# Patient Record
Sex: MALE | Race: WHITE | NOT HISPANIC OR LATINO | Employment: FULL TIME | URBAN - METROPOLITAN AREA
[De-identification: names, ages, dates, MRNs, and addresses within clinical notes are randomized per-mention and may not be internally consistent; named-entity substitution may affect disease eponyms.]

---

## 2023-06-17 ENCOUNTER — APPOINTMENT (EMERGENCY)
Dept: CT IMAGING | Facility: HOSPITAL | Age: 48
DRG: 354 | End: 2023-06-17
Payer: COMMERCIAL

## 2023-06-17 ENCOUNTER — HOSPITAL ENCOUNTER (INPATIENT)
Facility: HOSPITAL | Age: 48
LOS: 2 days | Discharge: HOME | DRG: 354 | End: 2023-06-19
Attending: EMERGENCY MEDICINE | Admitting: SURGERY
Payer: COMMERCIAL

## 2023-06-17 DIAGNOSIS — K43.6 VENTRAL HERNIA WITH BOWEL OBSTRUCTION: ICD-10-CM

## 2023-06-17 DIAGNOSIS — E66.01 MORBID OBESITY (HCC): ICD-10-CM

## 2023-06-17 DIAGNOSIS — K56.609 LARGE BOWEL OBSTRUCTION (HCC): ICD-10-CM

## 2023-06-17 DIAGNOSIS — K59.00 CONSTIPATION: ICD-10-CM

## 2023-06-17 DIAGNOSIS — R10.31 RIGHT LOWER QUADRANT ABDOMINAL PAIN: Primary | ICD-10-CM

## 2023-06-17 LAB
ALBUMIN SERPL BCP-MCNC: 4.3 G/DL (ref 3.5–5)
ALP SERPL-CCNC: 70 U/L (ref 34–104)
ALT SERPL W P-5'-P-CCNC: 23 U/L (ref 7–52)
ANION GAP SERPL CALCULATED.3IONS-SCNC: 6 MMOL/L (ref 4–13)
AST SERPL W P-5'-P-CCNC: 21 U/L (ref 13–39)
BASOPHILS # BLD AUTO: 0.04 THOUSANDS/ÂΜL (ref 0–0.1)
BASOPHILS NFR BLD AUTO: 0 % (ref 0–1)
BILIRUB DIRECT SERPL-MCNC: 0.13 MG/DL (ref 0–0.2)
BILIRUB SERPL-MCNC: 0.57 MG/DL (ref 0.2–1)
BUN SERPL-MCNC: 15 MG/DL (ref 5–25)
CALCIUM SERPL-MCNC: 9.4 MG/DL (ref 8.4–10.2)
CHLORIDE SERPL-SCNC: 97 MMOL/L (ref 96–108)
CO2 SERPL-SCNC: 32 MMOL/L (ref 21–32)
CREAT SERPL-MCNC: 1.17 MG/DL (ref 0.6–1.3)
EOSINOPHIL # BLD AUTO: 0.14 THOUSAND/ÂΜL (ref 0–0.61)
EOSINOPHIL NFR BLD AUTO: 1 % (ref 0–6)
ERYTHROCYTE [DISTWIDTH] IN BLOOD BY AUTOMATED COUNT: 15.1 % (ref 11.6–15.1)
GFR SERPL CREATININE-BSD FRML MDRD: 72 ML/MIN/1.73SQ M
GLUCOSE SERPL-MCNC: 115 MG/DL (ref 65–140)
HCT VFR BLD AUTO: 44.6 % (ref 36.5–49.3)
HGB BLD-MCNC: 14.6 G/DL (ref 12–17)
IMM GRANULOCYTES # BLD AUTO: 0.03 THOUSAND/UL (ref 0–0.2)
IMM GRANULOCYTES NFR BLD AUTO: 0 % (ref 0–2)
LIPASE SERPL-CCNC: 12 U/L (ref 11–82)
LYMPHOCYTES # BLD AUTO: 1.56 THOUSANDS/ÂΜL (ref 0.6–4.47)
LYMPHOCYTES NFR BLD AUTO: 13 % (ref 14–44)
MCH RBC QN AUTO: 28.8 PG (ref 26.8–34.3)
MCHC RBC AUTO-ENTMCNC: 32.7 G/DL (ref 31.4–37.4)
MCV RBC AUTO: 88 FL (ref 82–98)
MONOCYTES # BLD AUTO: 1.09 THOUSAND/ÂΜL (ref 0.17–1.22)
MONOCYTES NFR BLD AUTO: 9 % (ref 4–12)
NEUTROPHILS # BLD AUTO: 9.54 THOUSANDS/ÂΜL (ref 1.85–7.62)
NEUTS SEG NFR BLD AUTO: 77 % (ref 43–75)
NRBC BLD AUTO-RTO: 0 /100 WBCS
PLATELET # BLD AUTO: 338 THOUSANDS/UL (ref 149–390)
PLATELET # BLD AUTO: 356 THOUSANDS/UL (ref 149–390)
PMV BLD AUTO: 9.6 FL (ref 8.9–12.7)
PMV BLD AUTO: 9.7 FL (ref 8.9–12.7)
POTASSIUM SERPL-SCNC: 4 MMOL/L (ref 3.5–5.3)
PROT SERPL-MCNC: 8.3 G/DL (ref 6.4–8.4)
RBC # BLD AUTO: 5.07 MILLION/UL (ref 3.88–5.62)
SODIUM SERPL-SCNC: 135 MMOL/L (ref 135–147)
WBC # BLD AUTO: 12.4 THOUSAND/UL (ref 4.31–10.16)

## 2023-06-17 PROCEDURE — 96374 THER/PROPH/DIAG INJ IV PUSH: CPT

## 2023-06-17 PROCEDURE — G1004 CDSM NDSC: HCPCS

## 2023-06-17 PROCEDURE — 96376 TX/PRO/DX INJ SAME DRUG ADON: CPT

## 2023-06-17 PROCEDURE — 80048 BASIC METABOLIC PNL TOTAL CA: CPT | Performed by: EMERGENCY MEDICINE

## 2023-06-17 PROCEDURE — 85049 AUTOMATED PLATELET COUNT: CPT | Performed by: SURGERY

## 2023-06-17 PROCEDURE — 96361 HYDRATE IV INFUSION ADD-ON: CPT

## 2023-06-17 PROCEDURE — 99284 EMERGENCY DEPT VISIT MOD MDM: CPT

## 2023-06-17 PROCEDURE — 36415 COLL VENOUS BLD VENIPUNCTURE: CPT | Performed by: EMERGENCY MEDICINE

## 2023-06-17 PROCEDURE — 85025 COMPLETE CBC W/AUTO DIFF WBC: CPT | Performed by: EMERGENCY MEDICINE

## 2023-06-17 PROCEDURE — 96375 TX/PRO/DX INJ NEW DRUG ADDON: CPT

## 2023-06-17 PROCEDURE — 83690 ASSAY OF LIPASE: CPT | Performed by: EMERGENCY MEDICINE

## 2023-06-17 PROCEDURE — 80076 HEPATIC FUNCTION PANEL: CPT | Performed by: EMERGENCY MEDICINE

## 2023-06-17 PROCEDURE — 74177 CT ABD & PELVIS W/CONTRAST: CPT

## 2023-06-17 RX ORDER — TORSEMIDE 100 MG/1
20 TABLET ORAL DAILY
COMMUNITY

## 2023-06-17 RX ORDER — METFORMIN HYDROCHLORIDE 500 MG/1
500 TABLET, EXTENDED RELEASE ORAL
COMMUNITY

## 2023-06-17 RX ORDER — SODIUM CHLORIDE 9 MG/ML
125 INJECTION, SOLUTION INTRAVENOUS CONTINUOUS
Status: DISCONTINUED | OUTPATIENT
Start: 2023-06-17 | End: 2023-06-17

## 2023-06-17 RX ORDER — HYDROMORPHONE HCL/PF 1 MG/ML
1 SYRINGE (ML) INJECTION ONCE
Status: COMPLETED | OUTPATIENT
Start: 2023-06-17 | End: 2023-06-17

## 2023-06-17 RX ORDER — HEPARIN SODIUM 5000 [USP'U]/ML
7500 INJECTION, SOLUTION INTRAVENOUS; SUBCUTANEOUS EVERY 8 HOURS SCHEDULED
Status: DISCONTINUED | OUTPATIENT
Start: 2023-06-17 | End: 2023-06-19 | Stop reason: HOSPADM

## 2023-06-17 RX ORDER — TELMISARTAN AND HYDROCHLORTHIAZIDE 40; 12.5 MG/1; MG/1
1 TABLET ORAL DAILY
COMMUNITY

## 2023-06-17 RX ORDER — BUPROPION HYDROCHLORIDE 100 MG/1
100 TABLET ORAL 2 TIMES DAILY
COMMUNITY

## 2023-06-17 RX ORDER — MORPHINE SULFATE 4 MG/ML
4 INJECTION, SOLUTION INTRAMUSCULAR; INTRAVENOUS ONCE
Status: COMPLETED | OUTPATIENT
Start: 2023-06-17 | End: 2023-06-17

## 2023-06-17 RX ORDER — DEXTROSE MONOHYDRATE, SODIUM CHLORIDE, AND POTASSIUM CHLORIDE 50; 1.49; 4.5 G/1000ML; G/1000ML; G/1000ML
125 INJECTION, SOLUTION INTRAVENOUS CONTINUOUS
Status: DISCONTINUED | OUTPATIENT
Start: 2023-06-17 | End: 2023-06-18

## 2023-06-17 RX ORDER — KETOROLAC TROMETHAMINE 30 MG/ML
15 INJECTION, SOLUTION INTRAMUSCULAR; INTRAVENOUS ONCE
Status: COMPLETED | OUTPATIENT
Start: 2023-06-17 | End: 2023-06-17

## 2023-06-17 RX ORDER — HYDROMORPHONE HCL/PF 1 MG/ML
0.5 SYRINGE (ML) INJECTION
Status: DISCONTINUED | OUTPATIENT
Start: 2023-06-17 | End: 2023-06-17

## 2023-06-17 RX ORDER — ONDANSETRON 2 MG/ML
4 INJECTION INTRAMUSCULAR; INTRAVENOUS EVERY 6 HOURS PRN
Status: DISCONTINUED | OUTPATIENT
Start: 2023-06-17 | End: 2023-06-19 | Stop reason: HOSPADM

## 2023-06-17 RX ADMIN — HEPARIN SODIUM 7500 UNITS: 5000 INJECTION INTRAVENOUS; SUBCUTANEOUS at 18:36

## 2023-06-17 RX ADMIN — KETOROLAC TROMETHAMINE 15 MG: 30 INJECTION, SOLUTION INTRAMUSCULAR at 13:04

## 2023-06-17 RX ADMIN — MORPHINE SULFATE 2 MG: 2 INJECTION, SOLUTION INTRAMUSCULAR; INTRAVENOUS at 20:15

## 2023-06-17 RX ADMIN — IOHEXOL 100 ML: 350 INJECTION, SOLUTION INTRAVENOUS at 15:06

## 2023-06-17 RX ADMIN — MORPHINE SULFATE 4 MG: 4 INJECTION INTRAVENOUS at 13:04

## 2023-06-17 RX ADMIN — SODIUM CHLORIDE 1000 ML: 0.9 INJECTION, SOLUTION INTRAVENOUS at 13:02

## 2023-06-17 RX ADMIN — HYDROMORPHONE HYDROCHLORIDE 1 MG: 1 INJECTION, SOLUTION INTRAMUSCULAR; INTRAVENOUS; SUBCUTANEOUS at 15:05

## 2023-06-17 RX ADMIN — SODIUM CHLORIDE 125 ML/HR: 0.9 INJECTION, SOLUTION INTRAVENOUS at 16:56

## 2023-06-17 RX ADMIN — MORPHINE SULFATE 2 MG: 2 INJECTION, SOLUTION INTRAMUSCULAR; INTRAVENOUS at 13:35

## 2023-06-17 RX ADMIN — MORPHINE SULFATE 2 MG: 2 INJECTION, SOLUTION INTRAMUSCULAR; INTRAVENOUS at 22:13

## 2023-06-17 RX ADMIN — DEXTROSE, SODIUM CHLORIDE, AND POTASSIUM CHLORIDE 125 ML/HR: 5; .45; .15 INJECTION INTRAVENOUS at 19:17

## 2023-06-17 RX ADMIN — HYDROMORPHONE HYDROCHLORIDE 1 MG: 1 INJECTION, SOLUTION INTRAMUSCULAR; INTRAVENOUS; SUBCUTANEOUS at 16:54

## 2023-06-17 NOTE — LETTER
[id number] [facility] [address]  Dept: [phone]    June 19, 2023     Patient: Master Lubin   YOB: 1975   Date of Visit: 6/17/2023       To Whom it May Concern:    Rebekah More is under my professional care  He was seen in the hospital from 6/17/2023 to 06/19/23  He may return to work on 6/26/23 with the following limitations of light duty and no heavy lifting more than 10-15lbs   If you have any questions or concerns, please don't hesitate to call    Dr Queen Abreu  [phone]       Sincerely,          Abel Seth PA-C

## 2023-06-17 NOTE — ED PROVIDER NOTES
History  Chief Complaint   Patient presents with   • Constipation     Pt reports constipation and abd pain x 3 days  Patient is a 20-year-old male with past medical history of morbid obesity, hypertension, prediabetes, recently started on Ozempic for weight loss 3 weeks ago, presents to the emergency department for constipation and severe right lower quadrant abdominal pain for the past 3 days  Patient states his last BM was about 3 days ago and he has had constant pain in his right lower quadrant, nonradiating  He states several years ago having similar constipation but reports normally he has no difficulty moving his bowel movements  He is unsure if this is related to the recent initiation of Ozempic  He does state that he is lost 25 pounds in the last 2 weeks since being on this medication  He denies any new fevers or chills, headache, dizziness or near syncope, cough, URI symptoms, chest pain, palpitations, dyspnea, abdominal distention, nausea, vomiting, blood per rectum or melena, dysuria, change in frequency, hematuria, flank pain, testicular pain, skin rash or color change, extremity weakness or paresthesia or other focal neurologic deficits  History provided by:  Patient and spouse   used: No    Constipation  Associated symptoms: abdominal pain    Associated symptoms: no back pain, no diarrhea, no dysuria, no fever, no nausea and no vomiting        None       Past Medical History:   Diagnosis Date   • Hypertension        History reviewed  No pertinent surgical history  History reviewed  No pertinent family history  I have reviewed and agree with the history as documented      E-Cigarette/Vaping   • E-Cigarette Use Never User      E-Cigarette/Vaping Substances     Social History     Tobacco Use   • Smoking status: Never   • Smokeless tobacco: Never   Vaping Use   • Vaping Use: Never used   Substance Use Topics   • Alcohol use: Never   • Drug use: Never       Review of Systems   Constitutional: Negative for chills and fever  HENT: Negative for congestion, ear pain, rhinorrhea and sore throat  Respiratory: Negative for cough, chest tightness, shortness of breath and wheezing  Cardiovascular: Negative for chest pain and palpitations  Gastrointestinal: Positive for abdominal pain and constipation  Negative for abdominal distention, blood in stool, diarrhea, nausea and vomiting  Genitourinary: Negative for dysuria, flank pain, frequency and hematuria  Musculoskeletal: Negative for back pain, neck pain and neck stiffness  Skin: Negative for color change, pallor, rash and wound  Allergic/Immunologic: Negative for immunocompromised state  Neurological: Negative for dizziness, syncope, weakness, light-headedness, numbness and headaches  Hematological: Negative for adenopathy  Psychiatric/Behavioral: Negative for confusion, decreased concentration and sleep disturbance  All other systems reviewed and are negative  Physical Exam  Physical Exam  Vitals and nursing note reviewed  Constitutional:       General: He is not in acute distress  Appearance: Normal appearance  He is well-developed  He is obese  He is not ill-appearing, toxic-appearing or diaphoretic  Comments: Patient morbidly obese with large abdominal girth  HENT:      Head: Normocephalic and atraumatic  Right Ear: External ear normal       Left Ear: External ear normal       Nose: Nose normal       Mouth/Throat:      Mouth: Mucous membranes are moist       Pharynx: Oropharynx is clear  Eyes:      Extraocular Movements: Extraocular movements intact  Conjunctiva/sclera: Conjunctivae normal    Neck:      Vascular: No JVD  Cardiovascular:      Rate and Rhythm: Normal rate and regular rhythm  Pulses: Normal pulses  Heart sounds: Normal heart sounds  No murmur heard  No friction rub  No gallop     Pulmonary:      Effort: Pulmonary effort is normal  No respiratory distress  Breath sounds: Normal breath sounds  No wheezing, rhonchi or rales  Abdominal:      General: There is no distension  Palpations: Abdomen is soft  Tenderness: There is abdominal tenderness  There is no guarding or rebound  Comments: Morbidly obese abdomen  Very large abdominal girth, limiting examination  Patient has diffuse lower abdominal tenderness  Musculoskeletal:         General: No swelling or tenderness  Normal range of motion  Cervical back: Normal range of motion and neck supple  No rigidity  Skin:     General: Skin is warm and dry  Coloration: Skin is not pale  Findings: No erythema or rash  Neurological:      General: No focal deficit present  Mental Status: He is alert and oriented to person, place, and time  Sensory: No sensory deficit  Motor: No weakness  Psychiatric:         Behavior: Behavior normal          Thought Content:  Thought content normal          Vital Signs  ED Triage Vitals   Temperature Pulse Respirations Blood Pressure SpO2   06/17/23 1125 06/17/23 1125 06/17/23 1125 06/17/23 1125 06/17/23 1125   (!) 97 2 °F (36 2 °C) 87 (!) 24 138/65 95 %      Temp Source Heart Rate Source Patient Position - Orthostatic VS BP Location FiO2 (%)   06/17/23 1125 06/17/23 1125 06/17/23 1125 06/17/23 1125 --   Temporal Monitor Sitting Left arm       Pain Score       06/17/23 1304       8         Vitals:    06/17/23 1125 06/17/23 1658   BP: 138/65 117/86   BP Location: Left arm Right arm   Pulse: 87 83   Resp: (!) 24 20   Temp: (!) 97 2 °F (36 2 °C)    TempSrc: Temporal    SpO2: 95% 95%   Weight: (!) 234 kg (515 lb)        Visual Acuity      ED Medications  Medications   dextrose 5 % and sodium chloride 0 45 % with KCl 20 mEq/L infusion (has no administration in time range)   ondansetron (ZOFRAN) injection 4 mg (has no administration in time range)   heparin (porcine) subcutaneous injection 7,500 Units (has no administration in time range)   HYDROmorphone (DILAUDID) injection 0 5 mg (has no administration in time range)   sodium chloride 0 9 % bolus 1,000 mL (0 mL Intravenous Stopped 6/17/23 1654)   ketorolac (TORADOL) injection 15 mg (15 mg Intravenous Given 6/17/23 1304)   morphine injection 4 mg (4 mg Intravenous Given 6/17/23 1304)   morphine injection 2 mg (2 mg Intravenous Given 6/17/23 1335)   HYDROmorphone (DILAUDID) injection 1 mg (1 mg Intravenous Given 6/17/23 1505)   iohexol (OMNIPAQUE) 350 MG/ML injection (SINGLE-DOSE) 100 mL (100 mL Intravenous Given 6/17/23 1506)   HYDROmorphone (DILAUDID) injection 1 mg (1 mg Intravenous Given 6/17/23 1654)       Diagnostic Studies  Results Reviewed     Procedure Component Value Units Date/Time    Platelet count [[id number]]     Lab Status: No result Specimen: Blood     Lipase [[id number]]  (Normal) Collected: 06/17/23 1259    Lab Status: Final result Specimen: Blood from Arm, Left Updated: 06/17/23 1327     Lipase 12 u/L     Basic metabolic panel [[id number]] Collected: 06/17/23 1259    Lab Status: Final result Specimen: Blood from Arm, Left Updated: 06/17/23 1327     Sodium 135 mmol/L      Potassium 4 0 mmol/L      Chloride 97 mmol/L      CO2 32 mmol/L      ANION GAP 6 mmol/L      BUN 15 mg/dL      Creatinine 1 17 mg/dL      Glucose 115 mg/dL      Calcium 9 4 mg/dL      eGFR 72 ml/min/1 73sq m     Narrative:      [place] guidelines for Chronic Kidney Disease (CKD):   •  Stage 1 with normal or high GFR (GFR > 90 mL/min/1 73 square meters)  •  Stage 2 Mild CKD (GFR = 60-89 mL/min/1 73 square meters)  •  Stage 3A Moderate CKD (GFR = 45-59 mL/min/1 73 square meters)  •  Stage 3B Moderate CKD (GFR = 30-44 mL/min/1 73 square meters)  •  Stage 4 Severe CKD (GFR = 15-29 mL/min/1 73 square meters)  •  Stage 5 End Stage CKD (GFR <15 mL/min/1 73 square meters)  Note: GFR calculation is accurate only with a steady state creatinine    Hepatic function panel [[id number]]  (Normal) Collected: 06/17/23 1259    Lab Status: Final result Specimen: Blood from Arm, Left Updated: 06/17/23 1327     Total Bilirubin 0 57 mg/dL      Bilirubin, Direct 0 13 mg/dL      Alkaline Phosphatase 70 U/L      AST 21 U/L      ALT 23 U/L      Total Protein 8 3 g/dL      Albumin 4 3 g/dL     CBC and differential [[id number]]  (Abnormal) Collected: 06/17/23 1259    Lab Status: Final result Specimen: Blood from Arm, Left Updated: 06/17/23 1305     WBC 12 40 Thousand/uL      RBC 5 07 Million/uL      Hemoglobin 14 6 g/dL      Hematocrit 44 6 %      MCV 88 fL      MCH 28 8 pg      MCHC 32 7 g/dL      RDW 15 1 %      MPV 9 6 fL      Platelets 487 Thousands/uL      nRBC 0 /100 WBCs      Neutrophils Relative 77 %      Immat GRANS % 0 %      Lymphocytes Relative 13 %      Monocytes Relative 9 %      Eosinophils Relative 1 %      Basophils Relative 0 %      Neutrophils Absolute 9 54 Thousands/µL      Immature Grans Absolute 0 03 Thousand/uL      Lymphocytes Absolute 1 56 Thousands/µL      Monocytes Absolute 1 09 Thousand/µL      Eosinophils Absolute 0 14 Thousand/µL      Basophils Absolute 0 04 Thousands/µL     UA (URINE) with reflex to Scope [[id number]]     Lab Status: No result Specimen: Urine                  CT abdomen pelvis with contrast   Final Result by Nadya Edwards MD (06/17 1539)      1  Ventral hernia containing a short segment of the proximal most sigmoid colon, creating at least a partial large bowel obstruction  2  Further superiorly additional ventral hernia containing only adipose tissue  The examination demonstrates a significant  finding and was documented as such in [facility] for liaison and referring practitioner immediate notification  Workstation performed: [id number]                    Procedures  Procedures         ED Course  ED Course as of 06/17/23 1711   Sat Jun 17, 2023   1250 Poke with CT techs and the weight limit for the [facility] CT scan is 500 pounds and patient weighs 515 pounds  There is a bariatric CT scanner at the [facility] however CT tech to check on patient's abdominal girth as if he does not fit, he will need transfer to [address] [place] [address] for bariatric CT scan  [phone] with CT tech and she reported that the CT at [place] is the same diameter as the CT scan at the [facility] building  I spoke with hospital supervisor who gave approval to take patient to the building next-door to use the bariatric CT scan  Spoke with security and will have nursing contact EMS to transport patient  [phone] EMS scheduled to come at 15:30 to take patient next-door to the bariatric CT scanner  Patient and wife, hospital supervisor, security and CT techs made aware of plan  7651 Patient returned from CT scan and images were able to be obtained however it is unclear how good of quality the images are  Will await CT radiology read  Patient reassessed upon return and reports pain is increasing again  Will give 1mg IV dilaudid  Pain seems to be out of proportion to what would be expected with constipation  [address] CT report and [place] texted general surgeon on-call, Dr Cait Wallace who is going to review the chart  Updated patient and wife about significant findings  Advised patient to remain NPO     1625 Surgeon trying to assess whether patient can stay at [facility] due to morbid obesity or will need transfer to bariatric center  56 Dr Cait Wallace accepted patient to [facility]  Patient updated  Medical Decision Making  39-year-old male with morbid obesity, prediabetes, hypertension, recently started on Ozempic, presents to the ED for 3 days of constipation and right lower quadrant abdominal pain  Patient has diffuse abdominal tenderness and complains of severe abdominal pain    Will work-up with abdominal labs and CT scan of the abdomen and pelvis to rule out other etiologies other than constipation such as acute appendicitis, nonspecific enteritis or colitis, diverticulitis, renal colic secondary to stone, UTI or pyelonephritis  Will provide IV fluids, Toradol, morphine for pain relief  Amount and/or Complexity of Data Reviewed  Labs: ordered  Decision-making details documented in ED Course  Radiology: ordered  Decision-making details documented in ED Course  Risk  Prescription drug management  Disposition  Final diagnoses:   Right lower quadrant abdominal pain   Constipation   Ventral hernia with bowel obstruction   Large bowel obstruction (HCC) - Partial   Morbid obesity ([address] )     Time reflects when diagnosis was documented in both MDM as applicable and the Disposition within this note     Time User Action Codes Description Comment    6/17/2023  3:25 PM Genaro Fergusson E Add [R10 31] Right lower quadrant abdominal pain     6/17/2023  3:25 PM Genaro Fergusson E Add [K59 00] Constipation     6/17/2023  4:48 PM Genaro Fergusson E Add [K43 6] Ventral hernia with bowel obstruction     6/17/2023  4:49 PM Genaro Fergusson E Add [K56 609] Large bowel obstruction ([facility] )     6/17/2023  4:49 PM Genaro Fergusson E Modify [K56 609] Large bowel obstruction ([facility] ) Partial    6/17/2023  4:49 PM Genaro Fergusson E Add [E66 01] Morbid obesity [facility])       ED Disposition     ED Disposition   Admit    Condition   Stable    Date/Time   Sat Jun 17, 2023  5:08 PM    Comment   Case was discussed with Dr Lor Rae (gen surg) and the patient's admission status was agreed to be Admission Status: inpatient status to the service of Dr Lor Rae   Follow-up Information    None         Patient's Medications    No medications on file       No discharge procedures on file      PDMP Review     None          ED Provider  Electronically Signed by           Kirby Lane, DO  06/17/23 STEFANI Aaron 65, DO  06/17/23 9360

## 2023-06-17 NOTE — LETTER
[address] [address]  Dept: [phone]    June 19, 2023     Patient: Harini Lubin   YOB: 1975   Date of Visit: 6/17/2023       To Whom it May Concern:    Lizet Viera is under my professional care  He was seen in the hospital from 6/17/2023 to 06/19/23  He may return to work on 6/4/23 with the following limitations light duty, no strenuous activity, no lifting over 10 pounds   If you have any questions or concerns, please don't hesitate to call    Dr Priti Jay  [phone]       Sincerely,          Piedad Mobley PA-C

## 2023-06-18 ENCOUNTER — ANESTHESIA EVENT (INPATIENT)
Dept: PERIOP | Facility: HOSPITAL | Age: 48
DRG: 354 | End: 2023-06-18
Payer: COMMERCIAL

## 2023-06-18 ENCOUNTER — ANESTHESIA (INPATIENT)
Dept: PERIOP | Facility: HOSPITAL | Age: 48
DRG: 354 | End: 2023-06-18
Payer: COMMERCIAL

## 2023-06-18 PROBLEM — E11.9 TYPE 2 DIABETES MELLITUS WITHOUT COMPLICATION, WITHOUT LONG-TERM CURRENT USE OF INSULIN (HCC): Status: ACTIVE | Noted: 2023-06-18

## 2023-06-18 PROBLEM — F41.9 ANXIETY: Status: ACTIVE | Noted: 2023-06-18

## 2023-06-18 PROBLEM — K43.6 VENTRAL HERNIA WITH OBSTRUCTION AND WITHOUT GANGRENE: Status: ACTIVE | Noted: 2023-06-18

## 2023-06-18 PROBLEM — R06.2 WHEEZING: Status: ACTIVE | Noted: 2023-06-18

## 2023-06-18 PROBLEM — K56.609 LARGE BOWEL OBSTRUCTION (HCC): Status: ACTIVE | Noted: 2023-06-18

## 2023-06-18 PROBLEM — E66.01 MORBID OBESITY (HCC): Status: ACTIVE | Noted: 2023-06-18

## 2023-06-18 PROBLEM — I10 HTN (HYPERTENSION): Status: ACTIVE | Noted: 2023-06-18

## 2023-06-18 LAB
ABO GROUP BLD: NORMAL
ABO GROUP BLD: NORMAL
ALBUMIN SERPL BCP-MCNC: 3.9 G/DL (ref 3.5–5)
ALP SERPL-CCNC: 63 U/L (ref 34–104)
ALT SERPL W P-5'-P-CCNC: 18 U/L (ref 7–52)
ANION GAP SERPL CALCULATED.3IONS-SCNC: 3 MMOL/L (ref 4–13)
AST SERPL W P-5'-P-CCNC: 19 U/L (ref 13–39)
BASOPHILS # BLD AUTO: 0.02 THOUSANDS/ÂΜL (ref 0–0.1)
BASOPHILS NFR BLD AUTO: 0 % (ref 0–1)
BILIRUB SERPL-MCNC: 0.46 MG/DL (ref 0.2–1)
BLD GP AB SCN SERPL QL: NEGATIVE
BUN SERPL-MCNC: 15 MG/DL (ref 5–25)
CALCIUM SERPL-MCNC: 8.1 MG/DL (ref 8.4–10.2)
CHLORIDE SERPL-SCNC: 100 MMOL/L (ref 96–108)
CO2 SERPL-SCNC: 31 MMOL/L (ref 21–32)
CREAT SERPL-MCNC: 1.17 MG/DL (ref 0.6–1.3)
EOSINOPHIL # BLD AUTO: 0.07 THOUSAND/ÂΜL (ref 0–0.61)
EOSINOPHIL NFR BLD AUTO: 0 % (ref 0–6)
ERYTHROCYTE [DISTWIDTH] IN BLOOD BY AUTOMATED COUNT: 15.5 % (ref 11.6–15.1)
GFR SERPL CREATININE-BSD FRML MDRD: 72 ML/MIN/1.73SQ M
GLUCOSE SERPL-MCNC: 135 MG/DL (ref 65–140)
GLUCOSE SERPL-MCNC: 157 MG/DL (ref 65–140)
HCT VFR BLD AUTO: 43.9 % (ref 36.5–49.3)
HGB BLD-MCNC: 13.7 G/DL (ref 12–17)
IMM GRANULOCYTES # BLD AUTO: 0.06 THOUSAND/UL (ref 0–0.2)
IMM GRANULOCYTES NFR BLD AUTO: 0 % (ref 0–2)
LACTATE SERPL-SCNC: 0.7 MMOL/L (ref 0.5–2)
LYMPHOCYTES # BLD AUTO: 0.94 THOUSANDS/ÂΜL (ref 0.6–4.47)
LYMPHOCYTES NFR BLD AUTO: 5 % (ref 14–44)
MAGNESIUM SERPL-MCNC: 4.6 MG/DL (ref 1.9–2.7)
MCH RBC QN AUTO: 28.9 PG (ref 26.8–34.3)
MCHC RBC AUTO-ENTMCNC: 31.2 G/DL (ref 31.4–37.4)
MCV RBC AUTO: 93 FL (ref 82–98)
MONOCYTES # BLD AUTO: 1.56 THOUSAND/ÂΜL (ref 0.17–1.22)
MONOCYTES NFR BLD AUTO: 9 % (ref 4–12)
NEUTROPHILS # BLD AUTO: 15.42 THOUSANDS/ÂΜL (ref 1.85–7.62)
NEUTS SEG NFR BLD AUTO: 86 % (ref 43–75)
NRBC BLD AUTO-RTO: 0 /100 WBCS
PHOSPHATE SERPL-MCNC: 4.1 MG/DL (ref 2.7–4.5)
PLATELET # BLD AUTO: 292 THOUSANDS/UL (ref 149–390)
PMV BLD AUTO: 9.5 FL (ref 8.9–12.7)
POTASSIUM SERPL-SCNC: 4.6 MMOL/L (ref 3.5–5.3)
PROT SERPL-MCNC: 7.3 G/DL (ref 6.4–8.4)
RBC # BLD AUTO: 4.74 MILLION/UL (ref 3.88–5.62)
RH BLD: POSITIVE
RH BLD: POSITIVE
SODIUM SERPL-SCNC: 134 MMOL/L (ref 135–147)
SPECIMEN EXPIRATION DATE: NORMAL
WBC # BLD AUTO: 18.07 THOUSAND/UL (ref 4.31–10.16)

## 2023-06-18 PROCEDURE — 99222 1ST HOSP IP/OBS MODERATE 55: CPT | Performed by: INTERNAL MEDICINE

## 2023-06-18 PROCEDURE — 82948 REAGENT STRIP/BLOOD GLUCOSE: CPT

## 2023-06-18 PROCEDURE — 85025 COMPLETE CBC W/AUTO DIFF WBC: CPT | Performed by: SURGERY

## 2023-06-18 PROCEDURE — 86900 BLOOD TYPING SEROLOGIC ABO: CPT | Performed by: PHYSICIAN ASSISTANT

## 2023-06-18 PROCEDURE — 0WQF4ZZ REPAIR ABDOMINAL WALL, PERCUTANEOUS ENDOSCOPIC APPROACH: ICD-10-PCS | Performed by: SURGERY

## 2023-06-18 PROCEDURE — 83605 ASSAY OF LACTIC ACID: CPT | Performed by: PHYSICIAN ASSISTANT

## 2023-06-18 PROCEDURE — 49594 RPR AA HRN 1ST 3-10 NCR/STRN: CPT | Performed by: PHYSICIAN ASSISTANT

## 2023-06-18 PROCEDURE — 86850 RBC ANTIBODY SCREEN: CPT | Performed by: PHYSICIAN ASSISTANT

## 2023-06-18 PROCEDURE — 83735 ASSAY OF MAGNESIUM: CPT | Performed by: SURGERY

## 2023-06-18 PROCEDURE — 49594 RPR AA HRN 1ST 3-10 NCR/STRN: CPT | Performed by: SURGERY

## 2023-06-18 PROCEDURE — 93005 ELECTROCARDIOGRAM TRACING: CPT

## 2023-06-18 PROCEDURE — 86901 BLOOD TYPING SEROLOGIC RH(D): CPT | Performed by: PHYSICIAN ASSISTANT

## 2023-06-18 PROCEDURE — 99223 1ST HOSP IP/OBS HIGH 75: CPT | Performed by: SURGERY

## 2023-06-18 PROCEDURE — 80053 COMPREHEN METABOLIC PANEL: CPT | Performed by: SURGERY

## 2023-06-18 PROCEDURE — 84100 ASSAY OF PHOSPHORUS: CPT | Performed by: SURGERY

## 2023-06-18 RX ORDER — SUCCINYLCHOLINE/SOD CL,ISO/PF 100 MG/5ML
SYRINGE (ML) INTRAVENOUS AS NEEDED
Status: DISCONTINUED | OUTPATIENT
Start: 2023-06-18 | End: 2023-06-18

## 2023-06-18 RX ORDER — PHENYLEPHRINE HCL IN 0.9% NACL 1 MG/10 ML
SYRINGE (ML) INTRAVENOUS AS NEEDED
Status: DISCONTINUED | OUTPATIENT
Start: 2023-06-18 | End: 2023-06-18

## 2023-06-18 RX ORDER — KETAMINE HCL IN NACL, ISO-OSM 100MG/10ML
SYRINGE (ML) INJECTION AS NEEDED
Status: DISCONTINUED | OUTPATIENT
Start: 2023-06-18 | End: 2023-06-18

## 2023-06-18 RX ORDER — ALBUTEROL SULFATE 2.5 MG/3ML
2.5 SOLUTION RESPIRATORY (INHALATION) EVERY 6 HOURS PRN
Status: DISCONTINUED | OUTPATIENT
Start: 2023-06-18 | End: 2023-06-19

## 2023-06-18 RX ORDER — BUPIVACAINE HYDROCHLORIDE 2.5 MG/ML
INJECTION, SOLUTION EPIDURAL; INFILTRATION; INTRACAUDAL AS NEEDED
Status: DISCONTINUED | OUTPATIENT
Start: 2023-06-18 | End: 2023-06-18 | Stop reason: HOSPADM

## 2023-06-18 RX ORDER — INSULIN LISPRO 100 [IU]/ML
1-5 INJECTION, SOLUTION INTRAVENOUS; SUBCUTANEOUS EVERY 6 HOURS SCHEDULED
Status: DISCONTINUED | OUTPATIENT
Start: 2023-06-18 | End: 2023-06-19 | Stop reason: HOSPADM

## 2023-06-18 RX ORDER — METOCLOPRAMIDE HYDROCHLORIDE 5 MG/ML
10 INJECTION INTRAMUSCULAR; INTRAVENOUS ONCE AS NEEDED
Status: DISCONTINUED | OUTPATIENT
Start: 2023-06-18 | End: 2023-06-18 | Stop reason: HOSPADM

## 2023-06-18 RX ORDER — ROCURONIUM BROMIDE 10 MG/ML
INJECTION, SOLUTION INTRAVENOUS AS NEEDED
Status: DISCONTINUED | OUTPATIENT
Start: 2023-06-18 | End: 2023-06-18

## 2023-06-18 RX ORDER — ONDANSETRON 2 MG/ML
INJECTION INTRAMUSCULAR; INTRAVENOUS AS NEEDED
Status: DISCONTINUED | OUTPATIENT
Start: 2023-06-18 | End: 2023-06-18

## 2023-06-18 RX ORDER — DEXAMETHASONE SODIUM PHOSPHATE 10 MG/ML
INJECTION, SOLUTION INTRAMUSCULAR; INTRAVENOUS AS NEEDED
Status: DISCONTINUED | OUTPATIENT
Start: 2023-06-18 | End: 2023-06-18

## 2023-06-18 RX ORDER — LIDOCAINE HYDROCHLORIDE 10 MG/ML
INJECTION, SOLUTION EPIDURAL; INFILTRATION; INTRACAUDAL; PERINEURAL AS NEEDED
Status: DISCONTINUED | OUTPATIENT
Start: 2023-06-18 | End: 2023-06-18

## 2023-06-18 RX ORDER — PROPOFOL 10 MG/ML
INJECTION, EMULSION INTRAVENOUS AS NEEDED
Status: DISCONTINUED | OUTPATIENT
Start: 2023-06-18 | End: 2023-06-18

## 2023-06-18 RX ORDER — HYDROMORPHONE HCL/PF 1 MG/ML
0.4 SYRINGE (ML) INJECTION
Status: DISCONTINUED | OUTPATIENT
Start: 2023-06-18 | End: 2023-06-18 | Stop reason: HOSPADM

## 2023-06-18 RX ORDER — MAGNESIUM HYDROXIDE 1200 MG/15ML
LIQUID ORAL AS NEEDED
Status: DISCONTINUED | OUTPATIENT
Start: 2023-06-18 | End: 2023-06-18 | Stop reason: HOSPADM

## 2023-06-18 RX ORDER — SODIUM CHLORIDE, SODIUM LACTATE, POTASSIUM CHLORIDE, CALCIUM CHLORIDE 600; 310; 30; 20 MG/100ML; MG/100ML; MG/100ML; MG/100ML
INJECTION, SOLUTION INTRAVENOUS CONTINUOUS PRN
Status: DISCONTINUED | OUTPATIENT
Start: 2023-06-18 | End: 2023-06-18

## 2023-06-18 RX ORDER — PROMETHAZINE HYDROCHLORIDE 25 MG/ML
25 INJECTION, SOLUTION INTRAMUSCULAR; INTRAVENOUS ONCE AS NEEDED
Status: DISCONTINUED | OUTPATIENT
Start: 2023-06-18 | End: 2023-06-18 | Stop reason: HOSPADM

## 2023-06-18 RX ORDER — DIPHENHYDRAMINE HYDROCHLORIDE 50 MG/ML
12.5 INJECTION INTRAMUSCULAR; INTRAVENOUS ONCE AS NEEDED
Status: DISCONTINUED | OUTPATIENT
Start: 2023-06-18 | End: 2023-06-18 | Stop reason: HOSPADM

## 2023-06-18 RX ORDER — DEXMEDETOMIDINE HYDROCHLORIDE 100 UG/ML
INJECTION, SOLUTION INTRAVENOUS AS NEEDED
Status: DISCONTINUED | OUTPATIENT
Start: 2023-06-18 | End: 2023-06-18

## 2023-06-18 RX ORDER — MIDAZOLAM HYDROCHLORIDE 2 MG/2ML
INJECTION, SOLUTION INTRAMUSCULAR; INTRAVENOUS AS NEEDED
Status: DISCONTINUED | OUTPATIENT
Start: 2023-06-18 | End: 2023-06-18

## 2023-06-18 RX ORDER — FENTANYL CITRATE 50 UG/ML
INJECTION, SOLUTION INTRAMUSCULAR; INTRAVENOUS AS NEEDED
Status: DISCONTINUED | OUTPATIENT
Start: 2023-06-18 | End: 2023-06-18

## 2023-06-18 RX ORDER — FENTANYL CITRATE/PF 50 MCG/ML
50 SYRINGE (ML) INJECTION
Status: DISCONTINUED | OUTPATIENT
Start: 2023-06-18 | End: 2023-06-18 | Stop reason: HOSPADM

## 2023-06-18 RX ORDER — HYDRALAZINE HYDROCHLORIDE 20 MG/ML
5 INJECTION INTRAMUSCULAR; INTRAVENOUS EVERY 6 HOURS PRN
Status: DISCONTINUED | OUTPATIENT
Start: 2023-06-18 | End: 2023-06-19 | Stop reason: HOSPADM

## 2023-06-18 RX ORDER — DEXTROSE MONOHYDRATE, SODIUM CHLORIDE, AND POTASSIUM CHLORIDE 50; 1.49; 4.5 G/1000ML; G/1000ML; G/1000ML
150 INJECTION, SOLUTION INTRAVENOUS CONTINUOUS
Status: DISCONTINUED | OUTPATIENT
Start: 2023-06-18 | End: 2023-06-19

## 2023-06-18 RX ADMIN — MORPHINE SULFATE 2 MG: 2 INJECTION, SOLUTION INTRAMUSCULAR; INTRAVENOUS at 05:02

## 2023-06-18 RX ADMIN — MORPHINE SULFATE 2 MG: 2 INJECTION, SOLUTION INTRAMUSCULAR; INTRAVENOUS at 03:01

## 2023-06-18 RX ADMIN — MORPHINE SULFATE 2 MG: 2 INJECTION, SOLUTION INTRAMUSCULAR; INTRAVENOUS at 08:42

## 2023-06-18 RX ADMIN — FENTANYL CITRATE 100 MCG: 50 INJECTION INTRAMUSCULAR; INTRAVENOUS at 14:43

## 2023-06-18 RX ADMIN — Medication 200 MCG: at 17:04

## 2023-06-18 RX ADMIN — DEXTROSE, SODIUM CHLORIDE, AND POTASSIUM CHLORIDE 150 ML/HR: 5; .45; .15 INJECTION INTRAVENOUS at 10:29

## 2023-06-18 RX ADMIN — HEPARIN SODIUM 7500 UNITS: 5000 INJECTION INTRAVENOUS; SUBCUTANEOUS at 12:56

## 2023-06-18 RX ADMIN — HEPARIN SODIUM 7500 UNITS: 5000 INJECTION INTRAVENOUS; SUBCUTANEOUS at 05:01

## 2023-06-18 RX ADMIN — DEXMEDETOMIDINE HYDROCHLORIDE 8 MCG: 100 INJECTION, SOLUTION INTRAVENOUS at 14:30

## 2023-06-18 RX ADMIN — ONDANSETRON 4 MG: 2 INJECTION INTRAMUSCULAR; INTRAVENOUS at 00:41

## 2023-06-18 RX ADMIN — MORPHINE SULFATE 2 MG: 2 INJECTION, SOLUTION INTRAMUSCULAR; INTRAVENOUS at 11:06

## 2023-06-18 RX ADMIN — MIDAZOLAM 2 MG: 1 INJECTION INTRAMUSCULAR; INTRAVENOUS at 14:36

## 2023-06-18 RX ADMIN — SODIUM CHLORIDE, SODIUM LACTATE, POTASSIUM CHLORIDE, AND CALCIUM CHLORIDE: .6; .31; .03; .02 INJECTION, SOLUTION INTRAVENOUS at 16:18

## 2023-06-18 RX ADMIN — Medication 250 MG: at 14:43

## 2023-06-18 RX ADMIN — MORPHINE SULFATE 2 MG: 2 INJECTION, SOLUTION INTRAMUSCULAR; INTRAVENOUS at 00:37

## 2023-06-18 RX ADMIN — ROCURONIUM BROMIDE 30 MG: 10 INJECTION, SOLUTION INTRAVENOUS at 15:24

## 2023-06-18 RX ADMIN — PROPOFOL 250 MG: 10 INJECTION, EMULSION INTRAVENOUS at 14:43

## 2023-06-18 RX ADMIN — DEXTROSE, SODIUM CHLORIDE, AND POTASSIUM CHLORIDE 150 ML/HR: 5; .45; .15 INJECTION INTRAVENOUS at 20:21

## 2023-06-18 RX ADMIN — DEXMEDETOMIDINE HYDROCHLORIDE 12 MCG: 100 INJECTION, SOLUTION INTRAVENOUS at 15:35

## 2023-06-18 RX ADMIN — DEXTROSE, SODIUM CHLORIDE, AND POTASSIUM CHLORIDE 125 ML/HR: 5; .45; .15 INJECTION INTRAVENOUS at 02:57

## 2023-06-18 RX ADMIN — SUGAMMADEX 600 MG: 100 INJECTION, SOLUTION INTRAVENOUS at 17:10

## 2023-06-18 RX ADMIN — CEFAZOLIN 3000 MG: 1 INJECTION, POWDER, FOR SOLUTION INTRAMUSCULAR; INTRAVENOUS at 14:49

## 2023-06-18 RX ADMIN — ROCURONIUM BROMIDE 20 MG: 10 INJECTION, SOLUTION INTRAVENOUS at 15:43

## 2023-06-18 RX ADMIN — ONDANSETRON 4 MG: 2 INJECTION INTRAMUSCULAR; INTRAVENOUS at 17:10

## 2023-06-18 RX ADMIN — LIDOCAINE HYDROCHLORIDE 50 MG: 10 INJECTION, SOLUTION EPIDURAL; INFILTRATION; INTRACAUDAL at 14:43

## 2023-06-18 RX ADMIN — Medication 200 MCG: at 14:53

## 2023-06-18 RX ADMIN — DEXAMETHASONE SODIUM PHOSPHATE 10 MG: 10 INJECTION, SOLUTION INTRAMUSCULAR; INTRAVENOUS at 15:14

## 2023-06-18 RX ADMIN — ROCURONIUM BROMIDE 50 MG: 10 INJECTION, SOLUTION INTRAVENOUS at 14:45

## 2023-06-18 RX ADMIN — SODIUM CHLORIDE, SODIUM LACTATE, POTASSIUM CHLORIDE, AND CALCIUM CHLORIDE: .6; .31; .03; .02 INJECTION, SOLUTION INTRAVENOUS at 14:26

## 2023-06-18 RX ADMIN — MORPHINE SULFATE 2 MG: 2 INJECTION, SOLUTION INTRAMUSCULAR; INTRAVENOUS at 20:21

## 2023-06-18 RX ADMIN — HEPARIN SODIUM 7500 UNITS: 5000 INJECTION INTRAVENOUS; SUBCUTANEOUS at 21:10

## 2023-06-18 RX ADMIN — Medication 50 MG: at 14:43

## 2023-06-18 RX ADMIN — MORPHINE SULFATE 2 MG: 2 INJECTION, SOLUTION INTRAMUSCULAR; INTRAVENOUS at 23:29

## 2023-06-18 RX ADMIN — PHENYLEPHRINE HYDROCHLORIDE 60 MCG/MIN: 10 INJECTION INTRAVENOUS at 15:05

## 2023-06-18 NOTE — ASSESSMENT & PLAN NOTE
Blood pressure currently stable after surgery  May be elevated with pain through the night    · Pain control for per primary team  · Plan to restart home medication of telmisartan and hydrochlorothiazide as well as torsemide in the morning  · Creatinine normal

## 2023-06-18 NOTE — CONSULTS
"[facility]  Consult  Name: Luli Lubin 50 y o  male I MRN: [id number]  Unit/Bed#: OR POOL I Date of Admission: 6/17/2023   Date of Service: 6/18/2023 I Hospital Day: 1    Consults    Assessment/Plan   Wheezing  Assessment & Plan  He does not have underlying lung disease per his family and he is not on home inhalers  He was having some wheezing and hypoxia postop which was improved with nebulizer treatments  We will continue this as needed    Morbid obesity [facility])  Assessment & Plan  He is working with a bariatric and nutrition team   He is on Ozempic at home  Would resume this on discharge    Type 2 diabetes mellitus without complication, without long-term current use of insulin ([facility])  Assessment & Plan  No results found for: \"HGBA1C\"    No results for input(s): \"POCGLU\" in the last 72 hours  Blood Sugar Average: Last 72 hrs:  ·   · He is on metformin at home  · Would place on insulin sliding scale  · Carb controlled diet when eating    Anxiety  Assessment & Plan  Patient anxious after anesthesia, did receive ketamine,  · On wellbutrin as an outpatient    HTN (hypertension)  Assessment & Plan  Blood pressure currently stable after surgery  May be elevated with pain through the night    · Pain control for per primary team  · Plan to restart home medication of telmisartan and hydrochlorothiazide as well as torsemide in the morning  · Creatinine normal         Patient is currently n p o  and I will place orders for IV medications in the event of hypertension though I suspect hypertension overnight will be predominantly due to pain and would recommend pain control first     VTE Prophylaxis:   Per primary team, currently on SCDs    Recommendations for Discharge:  · Resume metformin  · Resume Ozempic  · If blood pressure stable, would continue prior to admission meds including telmisartan, HCTZ combination and torsemide    Total Time Spent on Date of Encounter in care of patient: 54 " minutes This time was spent on one or more of the following: performing physical exam; counseling and coordination of care; obtaining or reviewing history; documenting in the medical record; reviewing/ordering tests, medications or procedures; communicating with other healthcare professionals and discussing with patient's family/caregivers  Collaboration of Care: Were Recommendations Directly Discussed with Primary Treatment Team? Yes    History of Present Illness:  Lizet Viera is a 50 y o  male who is originally admitted to the surgery service due to ventral hernia with large bowel obstruction  We are consulted for medical management of diabetes, hypertension, IRINEO  The patient is not able to provide a history due to postanesthesia and delirium  He is not currently in any pain appears comfortable in bed    His wife and children are able to corroborate his past medical history which include hypertension, diabetes, obesity and IRINEO  She provided his medications for me which are listed below  He is currently waiting on a sleep study where he needs to  the BiPAP machine from the medical equipment unit and will conduct the study at home  Does not require any oxygen or BiPAP at home  For his obesity he is working with a bariatric and nutrition team   He was started on Ozempic 2 weeks ago with significant weight loss  For his hypertension he is controlled on telmisartan and HCTZ combination pill as well as torsemide  For diabetes he takes metformin    He has not had any medications for 2 days due to his condition  Review of Systems:  Review of Systems   Unable to perform ROS: Acuity of condition       Past Medical and Surgical History:   Past Medical History:   Diagnosis Date   • Cancer ([facility] )     skin cancer on neck   • Hypertension        Unable to review in full surgery history    Meds/Allergies:  PTA meds:   Prior to Admission Medications   Prescriptions Last Dose Informant Patient Reported? "Taking? buPROPion (WELLBUTRIN) 100 mg tablet   Yes Yes   Sig: Take 100 mg by mouth 2 (two) times a day   metFORMIN (GLUCOPHAGE-XR) 500 mg 24 hr tablet   Yes Yes   Sig: Take 500 mg by mouth daily with breakfast   telmisartan-hydrochlorothiazide (MICARDIS HCT) 40-12 5 MG per tablet   Yes Yes   Sig: Take 1 tablet by mouth daily   torsemide (DEMADEX) 100 mg tablet   Yes Yes   Sig: Take 20 mg by mouth daily      Facility-Administered Medications: None       Allergies: No Known Allergies    Social History:  Marital Status: /Civil Union  Substance Use History:   Social History     Substance and Sexual Activity   Alcohol Use Never     Social History     Tobacco Use   Smoking Status Never   Smokeless Tobacco Never     Social History     Substance and Sexual Activity   Drug Use Never       Family History:  History reviewed  No pertinent family history  Unable to obtain due to patient postanesthesia  Physical Exam:   Vitals:   Blood Pressure: 113/64 (06/18/23 1820)  Pulse: 84 (06/18/23 1820)  Temperature: (!) 97 1 °F (36 2 °C) (06/18/23 1820)  Temp Source: Temporal (06/18/23 1338)  Respirations: 20 (06/18/23 1800)  Height: 5' 8\" (172 7 cm) (06/17/23 1757)  Weight - Scale: (!) 234 kg (515 lb) (06/17/23 1757)  SpO2: 92 % (06/18/23 1820)    Physical Exam  Vitals and nursing note reviewed  Constitutional:       General: He is in acute distress  Appearance: Normal appearance  He is obese  He is not ill-appearing or diaphoretic  HENT:      Head: Normocephalic  Nose: Nose normal  No congestion  Mouth/Throat:      Mouth: Mucous membranes are moist       Pharynx: Oropharynx is clear  No oropharyngeal exudate  Comments: On 3 L nasal cannula  Eyes:      General: No scleral icterus  Extraocular Movements: Extraocular movements intact  Conjunctiva/sclera: Conjunctivae normal    Cardiovascular:      Rate and Rhythm: Normal rate and regular rhythm  Heart sounds: No murmur heard  No gallop   " "  Pulmonary:      Effort: Pulmonary effort is normal  No respiratory distress  Breath sounds: Wheezing present  No rhonchi or rales  Abdominal:      General: Bowel sounds are normal  There is distension  Palpations: Abdomen is soft  Tenderness: There is no abdominal tenderness  Comments: 3 port sites covered with a clean dry bandage   Genitourinary:     Comments: No Wilson  Musculoskeletal:         General: Normal range of motion  Cervical back: Normal range of motion and neck supple  No rigidity  Right lower leg: Edema present  Left lower leg: Edema present  Skin:     General: Skin is warm  Coloration: Skin is not jaundiced or pale  Neurological:      Mental Status: He is alert  He is disoriented  Psychiatric:      Comments: Unable to assess, post anesthesia            Additional Data:   Lab Results:    Results from last 7 days   Lab Units 06/18/23  0439   WBC Thousand/uL 18 07*   HEMOGLOBIN g/dL 13 7   HEMATOCRIT % 43 9   PLATELETS Thousands/uL 292   NEUTROS PCT % 86*   LYMPHS PCT % 5*   MONOS PCT % 9   EOS PCT % 0     Results from last 7 days   Lab Units 06/18/23  0439   SODIUM mmol/L 134*   POTASSIUM mmol/L 4 6   CHLORIDE mmol/L 100   CO2 mmol/L 31   BUN mg/dL 15   CREATININE mg/dL 1 17   ANION GAP mmol/L 3*   CALCIUM mg/dL 8 1*   ALBUMIN g/dL 3 9   TOTAL BILIRUBIN mg/dL 0 46   ALK PHOS U/L 63   ALT U/L 18   AST U/L 19   GLUCOSE RANDOM mg/dL 135             No results found for: \"HGBA1C\"      Results from last 7 days   Lab Units 06/18/23  1317   LACTIC ACID mmol/L 0 7       Imaging: Personally reviewed the following imaging: abdominal/pelvic CT  CT abdomen pelvis with contrast   Final Result by Albino Bhardwaj MD (06/17 0828)      1  Ventral hernia containing a short segment of the proximal most sigmoid colon, creating at least a partial large bowel obstruction  2  Further superiorly additional ventral hernia containing only adipose tissue           The examination " demonstrates a significant  finding and was documented as such in Epic for liaison and referring practitioner immediate notification  Workstation performed: [id number]               ** Please Note: This note may have been constructed using a voice recognition system   **

## 2023-06-18 NOTE — ASSESSMENT & PLAN NOTE
"No results found for: \"HGBA1C\"    No results for input(s): \"POCGLU\" in the last 72 hours  Blood Sugar Average: Last 72 hrs:  ·   · He is on metformin at home    · Would place on insulin sliding scale  · Carb controlled diet when eating  "

## 2023-06-18 NOTE — ASSESSMENT & PLAN NOTE
He is working with a bariatric and nutrition team   He is on Ozempic at home    Would resume this on discharge

## 2023-06-18 NOTE — OP NOTE
OPERATIVE REPORT  PATIENT NAME: Yuridia Barton    :  1975  MRN: [id number]  Pt Location: [facility] OR ROOM 02    SURGERY DATE: 2023    Surgeon(s) and Role:     * Gracie Wade MD - Primary     * Rohan Glaser PA-C - Assisting    Preop Diagnosis:  Ventral hernia with bowel obstruction [K43 6]  Large bowel obstruction ([address] ) [Y38 750]    Post-Op Diagnosis Codes: * Ventral hernia with bowel obstruction [K43 6]     * Large bowel obstruction ([address] ) [K56 609]    Procedure(s):  REPAIR HERNIA VENTRAL  laparoscopic    Specimen(s):  None    Estimated Blood Loss:   Minimal    Drains:  Urethral Catheter Non-latex 16 Fr  (Active)   Number of days: 0   None    Anesthesia Type:   General    Operative Indications:  Ventral hernia with bowel obstruction [K43 6]  Large bowel obstruction ([address] ) [K56 609]    Operative Findings: There was incarceration of loop of sigmoid colon within the umbilical hernia, after reducing the hernia the loop of sigmoid colon was viable  There was no injuries to the mesentery  Defect measured approximately 3 5 cm  Very small space after insufflating the abdomen during laparoscopy, some dilated transverse colon and the small bowel  Defect was repaired primarily using the #1 strata fix suture in a continuous fashion  The rest of abdominal cavity showed no evidence of inflammatory or neoplastic process  There was also presence of a ventral hernia, above the umbilicus with incarceration of omentum, this was not reduced or even attempted to be reduced at this time  Complications:   None    Procedure and Technique:  The patient was identified and the patient was placed in the operating table in a supine position  After adequate esthesia induction and satisfactory endotracheal intubation the abdomen was prepped and draped in a sterile usual fashion with ChloraPrep  Timeout was called the patient was identified as postsurgical site      We utilized 5 mm bariatric trocar  to enter the abdominal cavity under direct vision, after the verifying the positioning the abdomen was insufflated with CO2  After obtaining adequate intraperitoneal pressure the scope was advanced and exploration was performed with above findings  Due to the limited space I proceeded to place another 5 mm bariatric trocar in the right upper quadrant under direct vision  At this point I proceeded to take down some of the adhesions between the omentum and the transverse colon which was carefully taken down using harmonic scalpel  Once we freed up the omentum from the transverse colon I was able to visualize the loop of sigmoid colon going through the umbilical defect, at this point 12 mm bariatric trocar was placed on the right side of the abdomen under direct vision  At this point I was able to grasp the loop of the sigmoid colon but I was not successfully reducing  At this point the defect was enlarged cephalad using cautery, protecting the bowel from the injury with the cautery  After opening the defect I was able to reduce the entire contents of the umbilical hernia including omentum and the loop of sigmoid colon  Preperitoneal fat was also reduced using cautery  The peritoneum around the defect was scored using cautery then we proceeded to close the defect primarily with #1 strata fix in a continuous fashion  The suturing and the placement of all the instruments was very challenging due to the tremendous adipose tissue which made introduction of the instruments and the angulation of the instruments very difficult  Once the defect was closed I elected not to place mesh because of the reduced space and the incarceration of sigmoid colon within the hernia sac  At this point all the ports were removed under direct vision without evidence of bleeding from the abdominal wall    12 mm trocar site fascia was extended using a scalpel, taken down through the subcutaneous tissue with cautery until the defect was identified at the fascial level which was approximately 10 cm in distance from the skin  The fascia was closed with 0 Vicryl interrupted figure-of-eight fashion  Subcutaneous tissue at the 12 mm trocar site was approximated with 3-0 Vicryl in an interrupted fashion  Subcutaneous tissue and all of the incisions were infiltrated with 0 5% of Marcaine  Skin on all the incisions were closed with 4-0 Vicryl in a continuous cuticular fashion  Sterile dressings were applied  At the end of the case instrument, needles, and sponge counts were correct  Patient tolerated the procedure well  I was present for the entire procedure , A qualified resident physician was not available  and A physician assistant was required during the procedure for retraction, tissue handling, dissection and suturing      Patient Disposition:  PACU , hemodynamically stable and extubated and stable        SIGNATURE: Teagan Nava MD  DATE: June 18, 2023  TIME: 5:18 PM

## 2023-06-18 NOTE — PLAN OF CARE
Problem: DISCHARGE PLANNING  Goal: Discharge to home or other facility with appropriate resources  Description: INTERVENTIONS:  - Identify barriers to discharge w/patient and caregiver  - Arrange for needed discharge resources and transportation as appropriate  - Identify discharge learning needs (meds, wound care, etc )  - Arrange for interpretive services to assist at discharge as needed  - Refer to Case Management Department for coordinating discharge planning if the patient needs post-hospital services based on physician/advanced practitioner order or complex needs related to functional status, cognitive ability, or social support system  Outcome: Progressing     Problem: PAIN - ADULT  Goal: Verbalizes/displays adequate comfort level or baseline comfort level  Description: Interventions:  - Encourage patient to monitor pain and request assistance  - Assess pain using appropriate pain scale  - Administer analgesics based on type and severity of pain and evaluate response  - Implement non-pharmacological measures as appropriate and evaluate response  - Consider cultural and social influences on pain and pain management  - Notify physician/advanced practitioner if interventions unsuccessful or patient reports new pain  Outcome: Progressing

## 2023-06-18 NOTE — ANESTHESIA PROCEDURE NOTES
Arterial Line Insertion    Performed by: Evens Mccabe DO  Authorized by: Evens Mccabe DO  Consent: Verbal consent obtained  Written consent obtained  Risks and benefits: risks, benefits and alternatives were discussed  Consent given by: patient  Patient understanding: patient states understanding of the procedure being performed  Patient consent: the patient's understanding of the procedure matches consent given  Procedure consent: procedure consent matches procedure scheduled  Relevant documents: relevant documents present and verified  Test results: test results available and properly labeled  Site marked: the operative site was marked  Radiology Images: Radiology Images displayed and confirmed  If images not available, report reviewed  Patient identity confirmed: anonymous protocol, patient vented/unresponsive  Preparation: Patient was prepped and draped in the usual sterile fashion    Indications: hemodynamic monitoring  Orientation:  Right  Location: radial artery  Procedure Details:  Needle gauge: 20  Seldinger technique: Seldinger technique used  Number of attempts: 1    Post-procedure:  Post-procedure: dressing applied  Waveform: good waveform  Post-procedure CNS: normal  Patient tolerance: Patient tolerated the procedure well with no immediate complications

## 2023-06-18 NOTE — ANESTHESIA POSTPROCEDURE EVALUATION
Post-Op Assessment Note    CV Status:  Stable  Pain Score: 0    Pain management: adequate     Mental Status:  Alert and awake   Hydration Status:  Euvolemic   PONV Controlled:  Controlled   Airway Patency:  Patent      Post Op Vitals Reviewed: Yes      Staff: CRNA         There were no known notable events for this encounter      /58 (06/18/23 1742)    Temp 99 2 °F (37 3 °C) (06/18/23 1742)    Pulse 91 (06/18/23 1742)   Resp 21 (06/18/23 1742)    SpO2   97

## 2023-06-18 NOTE — ASSESSMENT & PLAN NOTE
He does not have underlying lung disease per his family and he is not on home inhalers  He was having some wheezing and hypoxia postop which was improved with nebulizer treatments    We will continue this as needed

## 2023-06-18 NOTE — H&P
GENERAL SURGERY HISTORY AND PHYSICAL    Breezy Lubin 50 y o  male MRN: [id number]  Unit/Bed#: -01 Encounter: [id number]      Assessment/Plan   47M with Large Bowel Obstruction 2/2 Ventral hernia containing loop of sigmoid colon  Morbid Obesity  HTN   -Presented with lower abdominal pain since Thursday along with obstipation  CT scan shows ventral hernia with portion of sigmoid colon causing large bowel obstruction distention of proximal colon  Plan  Plan for OR today for open ventral hernia repair with mesh  This was discussed with the patient who is agreeable with the plan  Informed consent will be obtained by the surgeon  NPO/IVF for OR today  IV antibiotics for microbial coverage  Trend labs, monitor WBC  Monitor vitals  Pain control PRN  Serial abdominal exams  IS post-operatively  Ambulation/OOB post-operatively  DVT prophylaxis   ______________________________________________________________________  Chief Complaint abdominal pain and constipation    HPI: Tiffani Pickard is a 50y o  year old male with PMHx of hypertension and morbid obesity who presented with right lower quadrant and lower abdominal pain, and inability to have bowel movements  Patient states his abdominal pain started Thursday and persisted throughout Friday  He tried bowel regimen to help and thought he had constipation  He also had persistent lower abdominal pain  When his symptoms were not improving and he still has not have any bowel movements yesterday he decided to come to the emergency department  He is passing some flatus  He denies any chest pain or shortness of breath  He denies any fevers, chills, nausea or vomiting  He denies any pain of this nature in the past   He denies any previous knowledge of abdominal hernias  He states he is normally active at home  He has been on Ozempic for the past 2 weeks and has lost 25 pounds  Last dose of Ozempic was on Monday, 6/12/2023    He has changed his diet and has been "eating more healthy with more fiber  He has never had any abdominal surgeries in the past     Review of Systems   Constitutional: Positive for appetite change and fatigue  Negative for activity change, chills and fever  HENT: Negative  Negative for congestion and sore throat  Respiratory: Negative  Negative for cough, shortness of breath and wheezing  Cardiovascular: Positive for leg swelling  Negative for chest pain and palpitations  Gastrointestinal: Positive for abdominal pain and constipation  Negative for blood in stool, diarrhea, nausea and vomiting  Endocrine: Negative  Genitourinary: Negative for difficulty urinating, dysuria and frequency  Musculoskeletal: Negative  Skin: Negative for rash and wound  Allergic/Immunologic: Negative  Neurological: Negative  Negative for dizziness, weakness and headaches  Hematological: Negative  Psychiatric/Behavioral: Negative  All other systems reviewed and are negative  Meds/Allergies   No Known Allergies  all current active meds have been reviewed    Historical Information   Past Medical History:   Diagnosis Date   • Hypertension      History reviewed  No pertinent surgical history  Social History   Social History     Substance and Sexual Activity   Alcohol Use Never     Social History     Substance and Sexual Activity   Drug Use Never     Social History     Tobacco Use   Smoking Status Never   Smokeless Tobacco Never       Family History:  Negative/unremarkable except as detailed in HPI  Objective   Vitals: /66 (BP Location: Right arm)   Pulse 90   Temp (!) 97 3 °F (36 3 °C) (Oral)   Resp 19   Ht 5' 8\" (1 727 m)   Wt (!) 234 kg (515 lb)   SpO2 94%   BMI 78 31 kg/m² ,Body mass index is 78 31 kg/m²      Intake/Output Summary (Last 24 hours) at 6/18/2023 0905  Last data filed at 6/17/2023 1654  Gross per 24 hour   Intake 1000 ml   Output --   Net 1000 ml     Invasive Devices     Peripheral Intravenous Line  " "Duration           Peripheral IV 06/17/23 Left Antecubital <1 day                Lab Results:   CBC with diff:   Lab Results   Component Value Date    WBC 18 07 (H) 06/18/2023    HGB 13 7 06/18/2023    HCT 43 9 06/18/2023    MCV 93 06/18/2023     06/18/2023    RBC 4 74 06/18/2023    MCH 28 9 06/18/2023    MCHC 31 2 (L) 06/18/2023    RDW 15 5 (H) 06/18/2023    MPV 9 5 06/18/2023    NRBC 0 06/18/2023   , BMP/CMP:   Lab Results   Component Value Date    SODIUM 134 (L) 06/18/2023    K 4 6 06/18/2023     06/18/2023    CO2 31 06/18/2023    BUN 15 06/18/2023    CREATININE 1 17 06/18/2023    CALCIUM 8 1 (L) 06/18/2023    AST 19 06/18/2023    ALT 18 06/18/2023    ALKPHOS 63 06/18/2023    EGFR 72 06/18/2023   , Urinalysis: No results found for: \"COLORU\", \"CLARITYU\", \"SPECGRAV\", \"PHUR\", \"LEUKOCYTESUR\", \"NITRITE\", \"PROTEINUA\", \"GLUCOSEU\", \"KETONESU\", \"BILIRUBINUR\", \"BLOODU\"    Physical Exam  Vitals: /66 (BP Location: Right arm)   Pulse 90   Temp (!) 97 3 °F (36 3 °C) (Oral)   Resp 19   Ht 5' 8\" (1 727 m)   Wt (!) 234 kg (515 lb)   SpO2 94%   BMI 78 31 kg/m² ,Body mass index is 78 31 kg/m²  General appearance: AAO x3, uncomfortable, appears stated age, cooperative  HEENT: PERRL, sclera clear, anicterus, oral mucosa is pink  Neck: No carotid bruits, trachea is midline    Back: no tenderness,deformity  Lungs:clear throughout  Heart[de-identified] RRR, S1, S2 normal  Abdomen: Hypoactive bowel sounds, lower abdomen and periumbilical region with tenderness, skin thickening over lower pannus which patient states is chronic  Extremities: Bilateral lower extremity edema with venous stasis and hemosiderin staining  Skin: Warm, dry, no rash  Neurologic: CN II-XII grossly intact, no tremor, affect appropriate    Imaging Studies: CT abdomen pelvis with contrast    Result Date: 6/17/2023  Impression: 1   Ventral hernia containing a short segment of the proximal most sigmoid colon, creating at least a partial large bowel " obstruction  2  Further superiorly additional ventral hernia containing only adipose tissue  The examination demonstrates a significant  finding and was documented as such in [facility] for liaison and referring practitioner immediate notification  Workstation performed: [id number]     EKG, Pathology, and Other Studies: I have personally reviewed pertinent reports      VTE Prophylaxis: Sequential compression device (Venodyne)  and Heparin     Code Status: Level 1 - Full Code    Marlen Levy PA-C  6/18/2023

## 2023-06-18 NOTE — ANESTHESIA PREPROCEDURE EVALUATION
Procedure:  REPAIR HERNIA VENTRAL, laparoscopic (Abdomen)    Relevant Problems   ANESTHESIA (within normal limits)      CARDIO   (+) HTN (hypertension)      ENDO  pre-diabetoc      GI/HEPATIC  super morbid obesity  Hernia w/ large bowel obstruction, no SB distention on CT      /RENAL (within normal limits)      HEMATOLOGY (within normal limits)      NEURO/PSYCH   (+) Anxiety      PULMONARY (within normal limits)        Physical Exam    Airway    Mallampati score: II  TM Distance: >3 FB  Neck ROM: full     Dental       Cardiovascular  Cardiovascular exam normal    Pulmonary  Pulmonary exam normal     Other Findings        Anesthesia Plan  ASA Score- 4     Anesthesia Type- general with ASA Monitors  Additional Monitors:   Airway Plan: ETT  Comment:  Specifically discussed risks related to [place] and surgery including unantcipated diff airway, prolonged intubation and post-op resp failure, undiagnosed cardiac disease and physiologic challenges w/ laparoscopic surgery          Plan Factors-Exercise tolerance (METS): <4 METS  Chart reviewed  Imaging results reviewed  Existing labs reviewed  Patient summary reviewed  Patient is not a current smoker  Obstructive sleep apnea risk education given perioperatively  Induction- intravenous  Postoperative Plan- Plan for postoperative opioid use  Informed Consent- Anesthetic plan and risks discussed with patient  I personally reviewed this patient with the CRNA  Discussed and agreed on the Anesthesia Plan with the CRNA  Kay Shah

## 2023-06-19 VITALS
WEIGHT: 315 LBS | HEART RATE: 99 BPM | OXYGEN SATURATION: 92 % | TEMPERATURE: 97.7 F | BODY MASS INDEX: 47.74 KG/M2 | RESPIRATION RATE: 16 BRPM | DIASTOLIC BLOOD PRESSURE: 66 MMHG | SYSTOLIC BLOOD PRESSURE: 114 MMHG | HEIGHT: 68 IN

## 2023-06-19 PROBLEM — K56.609 LARGE BOWEL OBSTRUCTION (HCC): Status: RESOLVED | Noted: 2023-06-18 | Resolved: 2023-06-19

## 2023-06-19 PROBLEM — K43.6 VENTRAL HERNIA WITH OBSTRUCTION AND WITHOUT GANGRENE: Status: RESOLVED | Noted: 2023-06-18 | Resolved: 2023-06-19

## 2023-06-19 LAB
ANION GAP SERPL CALCULATED.3IONS-SCNC: 5 MMOL/L (ref 4–13)
BASOPHILS # BLD AUTO: 0.03 THOUSANDS/ÂΜL (ref 0–0.1)
BASOPHILS NFR BLD AUTO: 0 % (ref 0–1)
BUN SERPL-MCNC: 17 MG/DL (ref 5–25)
CALCIUM SERPL-MCNC: 7.8 MG/DL (ref 8.4–10.2)
CHLORIDE SERPL-SCNC: 99 MMOL/L (ref 96–108)
CO2 SERPL-SCNC: 28 MMOL/L (ref 21–32)
CREAT SERPL-MCNC: 1.17 MG/DL (ref 0.6–1.3)
EOSINOPHIL # BLD AUTO: 0 THOUSAND/ÂΜL (ref 0–0.61)
EOSINOPHIL NFR BLD AUTO: 0 % (ref 0–6)
ERYTHROCYTE [DISTWIDTH] IN BLOOD BY AUTOMATED COUNT: 15.5 % (ref 11.6–15.1)
GFR SERPL CREATININE-BSD FRML MDRD: 72 ML/MIN/1.73SQ M
GLUCOSE SERPL-MCNC: 133 MG/DL (ref 65–140)
GLUCOSE SERPL-MCNC: 156 MG/DL (ref 65–140)
GLUCOSE SERPL-MCNC: 95 MG/DL (ref 65–140)
HCT VFR BLD AUTO: 43.4 % (ref 36.5–49.3)
HGB BLD-MCNC: 13.5 G/DL (ref 12–17)
IMM GRANULOCYTES # BLD AUTO: 0.09 THOUSAND/UL (ref 0–0.2)
IMM GRANULOCYTES NFR BLD AUTO: 0 % (ref 0–2)
LYMPHOCYTES # BLD AUTO: 1.57 THOUSANDS/ÂΜL (ref 0.6–4.47)
LYMPHOCYTES NFR BLD AUTO: 7 % (ref 14–44)
MAGNESIUM SERPL-MCNC: 3.5 MG/DL (ref 1.9–2.7)
MCH RBC QN AUTO: 28.7 PG (ref 26.8–34.3)
MCHC RBC AUTO-ENTMCNC: 31.1 G/DL (ref 31.4–37.4)
MCV RBC AUTO: 92 FL (ref 82–98)
MONOCYTES # BLD AUTO: 1.73 THOUSAND/ÂΜL (ref 0.17–1.22)
MONOCYTES NFR BLD AUTO: 8 % (ref 4–12)
NEUTROPHILS # BLD AUTO: 19.04 THOUSANDS/ÂΜL (ref 1.85–7.62)
NEUTS SEG NFR BLD AUTO: 85 % (ref 43–75)
NRBC BLD AUTO-RTO: 0 /100 WBCS
PHOSPHATE SERPL-MCNC: 2.9 MG/DL (ref 2.7–4.5)
PLATELET # BLD AUTO: 299 THOUSANDS/UL (ref 149–390)
PMV BLD AUTO: 9.4 FL (ref 8.9–12.7)
POTASSIUM SERPL-SCNC: 5.1 MMOL/L (ref 3.5–5.3)
RBC # BLD AUTO: 4.71 MILLION/UL (ref 3.88–5.62)
SODIUM SERPL-SCNC: 132 MMOL/L (ref 135–147)
WBC # BLD AUTO: 22.46 THOUSAND/UL (ref 4.31–10.16)

## 2023-06-19 PROCEDURE — 85025 COMPLETE CBC W/AUTO DIFF WBC: CPT | Performed by: PHYSICIAN ASSISTANT

## 2023-06-19 PROCEDURE — 80048 BASIC METABOLIC PNL TOTAL CA: CPT | Performed by: PHYSICIAN ASSISTANT

## 2023-06-19 PROCEDURE — 99233 SBSQ HOSP IP/OBS HIGH 50: CPT | Performed by: STUDENT IN AN ORGANIZED HEALTH CARE EDUCATION/TRAINING PROGRAM

## 2023-06-19 PROCEDURE — 84100 ASSAY OF PHOSPHORUS: CPT | Performed by: PHYSICIAN ASSISTANT

## 2023-06-19 PROCEDURE — 82948 REAGENT STRIP/BLOOD GLUCOSE: CPT

## 2023-06-19 PROCEDURE — NC001 PR NO CHARGE: Performed by: PHYSICIAN ASSISTANT

## 2023-06-19 PROCEDURE — 83735 ASSAY OF MAGNESIUM: CPT | Performed by: PHYSICIAN ASSISTANT

## 2023-06-19 RX ORDER — OXYCODONE HYDROCHLORIDE 5 MG/1
5 TABLET ORAL EVERY 4 HOURS PRN
Status: DISCONTINUED | OUTPATIENT
Start: 2023-06-19 | End: 2023-06-19 | Stop reason: HOSPADM

## 2023-06-19 RX ORDER — OXYCODONE HYDROCHLORIDE 5 MG/1
5 TABLET ORAL EVERY 4 HOURS PRN
Qty: 10 TABLET | Refills: 0 | Status: SHIPPED | OUTPATIENT
Start: 2023-06-19 | End: 2023-06-22

## 2023-06-19 RX ORDER — ACETAMINOPHEN 325 MG/1
650 TABLET ORAL EVERY 6 HOURS PRN
Status: DISCONTINUED | OUTPATIENT
Start: 2023-06-19 | End: 2023-06-19 | Stop reason: HOSPADM

## 2023-06-19 RX ADMIN — DEXTROSE, SODIUM CHLORIDE, AND POTASSIUM CHLORIDE 150 ML/HR: 5; .45; .15 INJECTION INTRAVENOUS at 03:46

## 2023-06-19 RX ADMIN — MORPHINE SULFATE 2 MG: 2 INJECTION, SOLUTION INTRAMUSCULAR; INTRAVENOUS at 03:46

## 2023-06-19 RX ADMIN — ACETAMINOPHEN 650 MG: 325 TABLET, FILM COATED ORAL at 09:01

## 2023-06-19 RX ADMIN — HEPARIN SODIUM 7500 UNITS: 5000 INJECTION INTRAVENOUS; SUBCUTANEOUS at 05:16

## 2023-06-19 NOTE — PROGRESS NOTES
"[facility]  Progress Note  Name: Tyson Rebolledo  MRN: [id number]  Unit/Bed#: -01 I Date of Admission: 6/17/2023   Date of Service: 6/19/2023 I Hospital Day: 2    Assessment/Plan   * Large bowel obstruction [facility])  Assessment & Plan  Status post ventral hernia repair with mesh  Surgery is primary, continue post op care  Diet changed to clears from NPO today  Had 3 bowel movements overnight  Improving     Wheezing  Assessment & Plan  Post op hypoxia and wheezing  No history of pulmonary disease  Currently on 3 liters nasal canula  Monitor oxygen status   Continue nebulizers and add IV solumedrol today     Type 2 diabetes mellitus without complication, without long-term current use of insulin (HCC)  Assessment & Plan  No results found for: \"HGBA1C\"    Recent Labs     06/18/23  1925 06/18/23  2359   POCGLU 157* 156*       Blood Sugar Average: Last 72 hrs:  (P) 156 5  Adequately controlled on sliding scale  Monitor for now on current regimen             VTE Pharmacologic Prophylaxis: VTE Score: 4 Moderate Risk (Score 3-4) - Pharmacological DVT Prophylaxis Ordered: heparin  Patient Centered Rounds: I performed bedside rounds with nursing staff today  Discussions with Specialists or Other Care Team Provider: sandra    Education and Discussions with Family / Patient: Updated  (son) at bedside  Total Time Spent on Date of Encounter in care of patient: 35 minutes This time was spent on one or more of the following: performing physical exam; counseling and coordination of care; obtaining or reviewing history; documenting in the medical record; reviewing/ordering tests, medications or procedures; communicating with other healthcare professionals and discussing with patient's family/caregivers      Current Length of Stay: 2 day(s)  Current Patient Status: Inpatient   Certification Statement: The patient will continue to require additional inpatient hospital stay due to post op care, " escalation of diet  Discharge Plan: Anticipate discharge in >72 hrs to discharge location to be determined pending rehab evaluations  Code Status: Level 1 - Full Code    Subjective:   Patient feels better today     Objective:     Vitals:   Temp (24hrs), Av °F (36 7 °C), Min:97 1 °F (36 2 °C), Max:99 2 °F (37 3 °C)    Temp:  [97 1 °F (36 2 °C)-99 2 °F (37 3 °C)] 97 7 °F (36 5 °C)  HR:  [76-99] 99  Resp:  [16-24] 16  BP: (108-167)/(58-75) 114/66  SpO2:  [90 %-99 %] 92 %  Body mass index is 78 31 kg/m²  Input and Output Summary (last 24 hours): Intake/Output Summary (Last 24 hours) at 2023 0952  Last data filed at 2023 1746  Gross per 24 hour   Intake 2933 6 ml   Output --   Net 2933 6 ml       Physical Exam:   Physical Exam  Constitutional:       General: He is not in acute distress  Appearance: Normal appearance  He is not toxic-appearing  Cardiovascular:      Rate and Rhythm: Normal rate and regular rhythm  Heart sounds: Normal heart sounds  No murmur heard  Pulmonary:      Effort: Pulmonary effort is normal  No respiratory distress  Breath sounds: Normal breath sounds  Abdominal:      General: Abdomen is flat  There is no distension  Palpations: Abdomen is soft  Tenderness: There is no abdominal tenderness  Neurological:      General: No focal deficit present  Mental Status: He is alert and oriented to person, place, and time  Mental status is at baseline  Motor: No weakness            Additional Data:     Labs:  Results from last 7 days   Lab Units 23  0555   WBC Thousand/uL 22 46*   HEMOGLOBIN g/dL 13 5   HEMATOCRIT % 43 4   PLATELETS Thousands/uL 299   NEUTROS PCT % 85*   LYMPHS PCT % 7*   MONOS PCT % 8   EOS PCT % 0     Results from last 7 days   Lab Units 23  0555 23  0439   SODIUM mmol/L 132* 134*   POTASSIUM mmol/L 5 1 4 6   CHLORIDE mmol/L 99 100   CO2 mmol/L 28 31   BUN mg/dL 17 15   CREATININE mg/dL 1 17 1 17   ANION GAP mmol/L 5 3*   CALCIUM mg/dL 7 8* 8 1*   ALBUMIN g/dL  --  3 9   TOTAL BILIRUBIN mg/dL  --  0 46   ALK PHOS U/L  --  63   ALT U/L  --  18   AST U/L  --  19   GLUCOSE RANDOM mg/dL 133 135         Results from last 7 days   Lab Units 06/18/23  2359 06/18/23  1925   POC GLUCOSE mg/dl 156* 157*         Results from last 7 days   Lab Units 06/18/23  1317   LACTIC ACID mmol/L 0 7       Lines/Drains:  Invasive Devices       Peripheral Intravenous Line  Duration             Peripheral IV 06/17/23 Left Antecubital 1 day    Peripheral IV 06/18/23 Left;Dorsal (posterior) Hand <1 day                          Imaging: Reviewed radiology reports from this admission including: abdominal/pelvic CT    Recent Cultures (last 7 days):         Last 24 Hours Medication List:   Current Facility-Administered Medications   Medication Dose Route Frequency Provider Last Rate    acetaminophen  650 mg Oral Q6H PRN Jaci Hernandez MD      dextrose 5 % and sodium chloride 0 45 % with KCl 20 mEq/L  150 mL/hr Intravenous Continuous Romana Dings Wildrick, PA-C 150 mL/hr (06/19/23 0346)    heparin (porcine)  7,500 Units Subcutaneous Q8H [address] Maria Isabel Daigle PA-C      hydrALAZINE  5 mg Intravenous Q6H PRN Nila Yee MD      insulin lispro  1-5 Units Subcutaneous Q6H [address] Nila Yee MD      lactated ringers  1,000 mL Intravenous Once PRN Maria Isabel Daigle PA-C      And    lactated ringers  1,000 mL Intravenous Once PRN Romana Dings Wildrick, PA-C      morphine injection  2 mg Intravenous Q3H PRN Maria Isabel Daigle PA-C      ondansetron  4 mg Intravenous Q6H PRN Maria Isabel Daigle PA-C      sodium chloride  1,000 mL Intravenous Once PRN Maria Isabel Daigle PA-C      And    sodium chloride  1,000 mL Intravenous Once PRN Javy Howe PA-C          Today, Patient Was Seen By: Sima Agustin MD    **Please Note: This note may have been constructed using a voice recognition system  **

## 2023-06-19 NOTE — PROGRESS NOTES
Patient alert and oriented x 4  Pleasant to staff  Trochar sites no drainage on dressings  Patient expressed tenderness to the surgical sites- prn morphine administered during shift  Patient slept on and off during shift  Patient in need of oxygen during hours of sleep due oxygen saturation dropping as low as the 50's  Patient denies respiratory distress and when asked to take a deep breath oxygen saturation climbs back to the low 90's  Patient tolerated medication and continues on IV hydration with potassium  Patient had Large BM x 3 during shift  NPO during shift  Wife at bedside  No deviation in assessment finding noted when compared to previous recommendation  Will continue to monitor

## 2023-06-19 NOTE — UTILIZATION REVIEW
Initial Clinical Review    Admission: Date/Time/Statement:   Admission Orders (From admission, onward)     Ordered        06/17/23 1702  Inpatient Admission  Once                      Orders Placed This Encounter   Procedures   • Inpatient Admission     Standing Status:   Standing     Number of Occurrences:   1     Order Specific Question:   Level of Care     Answer:   Med Surg [16]     Order Specific Question:   Estimated length of stay     Answer:   More than 2 Midnights     Order Specific Question:   Certification     Answer:   I certify that inpatient services are medically necessary for this patient for a duration of greater than two midnights  See H&P and MD Progress Notes for additional information about the patient's course of treatment  ED Arrival Information     Expected   -    Arrival   6/17/2023 11:19    Acuity   Urgent            Means of arrival   Walk-In    Escorted by   Family Member    Service   Surgery-General    Admission type   Emergency            Arrival complaint   Abdominal Pain            Chief Complaint   Patient presents with   • Constipation     Pt reports constipation and abd pain x 3 days  Initial Presentation: 50 y o  male to the ED from home with complaints of constipation, abdominal pain for 3 days  Admitted to inpatient for large bowel obstruction  H/O htn, morbid obesity  Arrives tachypneic  REcently started ozempic 3 weeks prior for weight loss  Has right lower quadrant abdominal pain  In the ED, given iv dilaudid, zofran, morphine,started on iv fluids  Wbcs 12 40  Transported to [address] [place] [address] for bariatric ct scanner and returned to [facility]  Found to have ventral hernia, partial large bowel obstruction  Keep NPO  Continue iv fluids  Admitted under gen/surg service  Date: 6/18   Day 2:   OPERATIVE NOTE  SURGERY DATE: 6/18/2023  Procedure(s):  REPAIR HERNIA VENTRAL  laparoscopic  Anesthesia Type:   General  Operative Findings:   There was incarceration of loop of sigmoid colon within the umbilical hernia, after reducing the hernia the loop of sigmoid colon was viable  There was no injuries to the mesentery  Defect measured approximately 3 5 cm  Very small space after insufflating the abdomen during laparoscopy, some dilated transverse colon and the small bowel  Defect was repaired primarily using the #1 strata fix suture in a continuous fashion  The rest of abdominal cavity showed no evidence of inflammatory or neoplastic process  There was also presence of a ventral hernia, above the umbilicus with incarceration of omentum, this was not reduced or even attempted to be reduced at this time  Post operative wheezing  As per hospitalist:  Given nebulizers post op  Requiring 3 LNC  Wbcs up to 18  07       ED Triage Vitals   Temperature Pulse Respirations Blood Pressure SpO2   06/17/23 1125 06/17/23 1125 06/17/23 1125 06/17/23 1125 06/17/23 1125   (!) 97 2 °F (36 2 °C) 87 (!) 24 138/65 95 %      Temp Source Heart Rate Source Patient Position - Orthostatic VS BP Location FiO2 (%)   06/17/23 1125 06/17/23 1125 06/17/23 1125 06/17/23 1125 --   Temporal Monitor Sitting Left arm       Pain Score       06/17/23 1304       8          Wt Readings from Last 1 Encounters:   06/17/23 (!) 234 kg (515 lb)     Additional Vital Signs:   Date/Time Temp Pulse Resp BP MAP (mmHg) SpO2 Calculated FIO2 (%) - Nasal Cannula O2 Flow Rate (L/min) Nasal Cannula O2 Flow Rate (L/min) O2 Device Cardiac (WDL) Patient Position - Orthostatic VS   06/18/23 22:22:15 97 1 °F (36 2 °C) Abnormal  83 18 130/75 93 91 % 32 -- 3 L/min Nasal cannula -- Lying   06/18/23 20:10:24 97 9 °F (36 6 °C) 80 18 135/73 94 90 % 32 -- 3 L/min Nasal cannula -- Lying   06/18/23 18:20:26 97 1 °F (36 2 °C) Abnormal  84 18 113/64 80 92 % -- -- -- -- -- Lying   06/18/23 1800 98 7 °F (37 1 °C) 83 20 167/75 -- 97 % -- 3 L/min -- Nasal cannula WDL --   06/18/23 1745 98 9 °F (37 2 °C) 91 19 125/58 83 96 % -- 4 L/min -- Nasal cannula WDL --   06/18/23 1742 99 2 °F (37 3 °C) 91 21 123/58 83 96 % -- 6 L/min -- Simple mask WDL --   06/18/23 1338 98 1 °F (36 7 °C) 80 24 Abnormal  -- -- 97 % -- -- -- None (Room air) -- --   06/18/23 1106 -- 76 -- 108/65 79 97 % -- -- -- -- -- --   06/18/23 07:19:54 97 3 °F (36 3 °C) Abnormal  90 19 101/66 78 94 % -- -- -- Nasal cannula -- Lying   06/18/23 07:19:41 -- -- -- 101/66 78 -- -- -- -- -- -- --   06/18/23 0040 -- 87 -- 101/55 70 -- -- -- -- -- -- --   06/17/23 22:17:10 97 3 °F (36 3 °C) Abnormal  80 -- 103/56 72 98 % -- -- -- -- -- --   06/17/23 1759 -- -- -- -- -- -- -- -- -- Nasal cannula -- --   06/17/23 17:57:53 97 5 °F (36 4 °C) 86 -- 124/81 95 88 % Abnormal  -- -- -- -- -- --   06/17/23 1658 -- 83 20 117/86 -- 95 % -- -- -- None (Room air) -- Lying   06/17/23 1125 97 2 °F (36 2 °C) Abnormal  87 24 Abnormal  138/65 93 95 % -- -- -- None (Room air) -- Sitting       Pertinent Labs/Diagnostic Test Results:   CT abdomen pelvis with contrast   Final Result by Evie Goldstein MD (06/17 7545)      1  Ventral hernia containing a short segment of the proximal most sigmoid colon, creating at least a partial large bowel obstruction  2  Further superiorly additional ventral hernia containing only adipose tissue  The examination demonstrates a significant  finding and was documented as such in [facility] for liaison and referring practitioner immediate notification              Workstation performed: [id number]               Results from last 7 days   Lab Units 06/18/23  0439 06/17/23  1830 06/17/23  1259   WBC Thousand/uL 18 07*  --  12 40*   HEMOGLOBIN g/dL 13 7  --  14 6   HEMATOCRIT % 43 9  --  44 6   PLATELETS Thousands/uL 292 338 356   NEUTROS ABS Thousands/µL 15 42*  --  9 54*         Results from last 7 days   Lab Units 06/18/23  0439 06/17/23  1259   SODIUM mmol/L 134* 135   POTASSIUM mmol/L 4 6 4 0   CHLORIDE mmol/L 100 97   CO2 mmol/L 31 32   ANION GAP mmol/L 3* 6   BUN mg/dL 15 15   CREATININE mg/dL 1 17 1 17   EGFR ml/min/1 73sq m 72 72   CALCIUM mg/dL 8 1* 9 4   MAGNESIUM mg/dL 4 6*  --    PHOSPHORUS mg/dL 4 1  --      Results from last 7 days   Lab Units 06/18/23  0439 06/17/23  1259   AST U/L 19 21   ALT U/L 18 23   ALK PHOS U/L 63 70   TOTAL PROTEIN g/dL 7 3 8 3   ALBUMIN g/dL 3 9 4 3   TOTAL BILIRUBIN mg/dL 0 46 0 57   BILIRUBIN DIRECT mg/dL  --  0 13     Results from last 7 days   Lab Units 06/18/23  2359 06/18/23  1925   POC GLUCOSE mg/dl 156* 157*     Results from last 7 days   Lab Units 06/19/23  0555 06/18/23  0439 06/17/23  1259   GLUCOSE RANDOM mg/dL 133 135 115         Results from last 7 days   Lab Units 06/18/23  1317   LACTIC ACID mmol/L 0 7       Results from last 7 days   Lab Units 06/17/23  1259   LIPASE u/L 12       ED Treatment:   Medication Administration from 06/17/2023 1119 to 06/17/2023 1750       Date/Time Order Dose Route Action     06/17/2023 1302 EDT sodium chloride 0 9 % bolus 1,000 mL 1,000 mL Intravenous New Bag     06/17/2023 1304 EDT ketorolac (TORADOL) injection 15 mg 15 mg Intravenous Given     06/17/2023 1304 EDT morphine injection 4 mg 4 mg Intravenous Given     06/17/2023 1335 EDT morphine injection 2 mg 2 mg Intravenous Given     06/17/2023 1505 EDT HYDROmorphone (DILAUDID) injection 1 mg 1 mg Intravenous Given     06/17/2023 1654 EDT HYDROmorphone (DILAUDID) injection 1 mg 1 mg Intravenous Given     06/17/2023 1656 EDT sodium chloride 0 9 % infusion 125 mL/hr Intravenous New Bag        Past Medical History:   Diagnosis Date   • Cancer ([facility] )     skin cancer on neck   • Hypertension          Admitting Diagnosis: Morbid obesity ([facility] ) [E66 01]  Abdominal pain [R10 9]  Constipation [K59 00]  Large bowel obstruction ([facility] ) [K56 609]  Right lower quadrant abdominal pain [R10 31]  Ventral hernia with bowel obstruction [K43 6]  Age/Sex: 50 y o  male  Admission Orders:  Scheduled Medications:  heparin (porcine), 7,500 Units, Subcutaneous, Q8H [facility]  insulin lispro, 1-5 Units, Subcutaneous, Q6H [address]    Ancef IV    Continuous IV Infusions:  dextrose 5 % and sodium chloride 0 45 % with KCl 20 mEq/L, 150 mL/hr, Intravenous, Continuous      PRN Meds:  acetaminophen, 650 mg, Oral, Q6H PRN  hydrALAZINE, 5 mg, Intravenous, Q6H PRN  lactated ringers, 1,000 mL, Intravenous, Once PRN   And  lactated ringers, 1,000 mL, Intravenous, Once PRN  morphine injection, 2 mg, Intravenous, Q3H PRN x 9 6/18  ondansetron, 4 mg, Intravenous, Q6H PRN  sodium chloride, 1,000 mL, Intravenous, Once PRN   And  sodium chloride, 1,000 mL, Intravenous, Once PRN        None    Network Utilization Review Department  ATTENTION: Please call with any questions or concerns to [phone] and carefully listen to the prompts so that you are directed to the right person  All voicemails are confidential   Crystal Andrade all requests for admission clinical reviews, approved or denied determinations and any other requests to dedicated fax number below belonging to the campus where the patient is receiving treatment   List of dedicated fax numbers for the Facilities:  [address] DENIALS (Administrative/Medical Necessity) [phone]   [address] (Maternity/NICU/Pediatrics) [phone]   8 Debbie Armstrong [phone]   [facility] [address] [phone]   [address] [place] [address] [address] [phone]   155 [facility] Donald Marie [facility] 134 815 [facility] [phone]

## 2023-06-19 NOTE — ASSESSMENT & PLAN NOTE
"No results found for: \"HGBA1C\"    Recent Labs     06/18/23  1925 06/18/23  2359   POCGLU 157* 156*       Blood Sugar Average: Last 72 hrs:  (P) 156 5  · Adequately controlled on sliding scale  · Monitor for now on current regimen  "

## 2023-06-19 NOTE — DISCHARGE INSTR - AVS FIRST PAGE
Follow up:  General: Please make sure you are scheduled for 2-week post op appointment  If you are not, please call the office to set up a post operative appointment to be seen in 2 weeks: [phone]    Wound care:    Bandage/Dressing -Make sure to remove the bandage in 48 hours, unless instructed otherwise by your surgeon  The steri-strips will fall off on their own  You may remove the steri strips 1 week from your surgery, if they are still in place  You may redress the incisions  Do not apply any creams, lotions, or ointments  Keep the incision clean and dry  You may shower starting the day after surgery (no baths, hot tubs, or swimming until you are cleared in the office for your post op)  It is OK to allow soapy water to run over incisions, then rinse and pat dry  DO NOT SCRUB  Please contact your surgeon if your incisions have redness, extreme tenderness, or signs of infections (Pain, swelling redness, odor or green/yellow discharge around incisions)  You should also contact your surgeon if the wound has persistent, active bleeding  3  Activity:   As tolerated, no strenuous activity for the first 2-4 weeks (unless advised differently by a provider)  Please take it easy for the first few days  If you have had abdominal surgery, do not lift anything heavier than 10 pounds for at least 6 weeks  We encourage you to use the provided incentive spirometer  If you develop gas pain, ambulation (walking) will help pass the gas from anesthesia  If you have severe gas pain, you may take GAS-X    Gradually increase your activity daily  Walking 3-4 times daily is good and stairs are ok  Listen to your body  If you start to get tired or sore then rest    No heavy lifting, pushing or pulling  No driving for 5 days or while taking narcotics for pain  4  Diet:   Resume your normal diet unless specified otherwise  We recommend you slowly advance your diet   Try to start with softer bland foods (soup, crackers, etc ) and gradually advance as tolerated  5  Medications:   Resume all your previous medications, unless told otherwise by the doctor  If taking narcotic pain medication, do not take on an empty stomach  Tylenol is ok to use for pain, unless you are taking any narcotic pain medication containing Tylenol (such as Percocet, Vicodin, or anything containing acetaminophen)  Do not take Tylenol if you're taking these medications  Take one or the other  Do not exceed more than 4000 mg of acetaminophen in 24 hours or 3000 mg if you have liver disease  You may also take ibuprofen for pain  If you become constipated, you may take a stool softener (colace)  Take the stool softener while you are taking narcotic pain medication to help prevent constipation  If you continue to have constipation you may take Miralax or Milk of Magnesium  All of these remedies are over the counter  If you were given an antibiotic take it until it is finished  6  Driving: You will need a  home from the hospital  Do not drive or make any important decisions while on narcotic pain medication or 24 hours after surgery  No driving for 5 days or while taking narcotic pain medication    7  Constipation:   Patients often experience constipation after surgery  You may take a stool softener (Colace) to help prevent constipation  If you experience significant nausea or vomiting after abdominal surgery, call the office before trying any stronger medications or laxatives  Call the office if you haven't had a Bowel movement in 2-3days after surgery    8  Call the office: If you are experiencing any of the following, A provider is on call 24 hours a day: You have a fever over 100 5°F or chills  You have pain or nausea that is not relieved by medicine  You have redness and swelling around your incisions, or blood or pus is leaking from your incisions  You are constipated or have diarrhea      Your skin or eyes are yellow, or your bowel movements are pale  You have questions or concerns about your surgery, condition, or care  Seek care immediately or call 911 if:   You cannot stop vomiting  Your bowel movements are black or bloody  You have pain in your abdomen and it is swollen or hard  Your arm or leg feels warm, tender, and painful  It may look swollen and red  You feel lightheaded, short of breath, and have chest pain  You cough up blood

## 2023-06-19 NOTE — RESPIRATORY THERAPY NOTE
"RT Protocol Note  Anthony Lubin 50 y o  male MRN: [id number]  Unit/Bed#: -01 Encounter: [id number]    Assessment    Principal Problem:    Large bowel obstruction ([facility] )  Active Problems:    HTN (hypertension)    Anxiety    Ventral hernia with obstruction and without gangrene    Type 2 diabetes mellitus without complication, without long-term current use of insulin (HCC)    Morbid obesity ([facility] )    Wheezing      Home Pulmonary Medications:  NA       Past Medical History:   Diagnosis Date   • Cancer ([place] [address] )     skin cancer on neck   • Hypertension      Social History     Socioeconomic History   • Marital status: /Civil Union     Spouse name: None   • Number of children: None   • Years of education: None   • Highest education level: None   Occupational History   • None   Tobacco Use   • Smoking status: Never   • Smokeless tobacco: Never   Vaping Use   • Vaping Use: Never used   Substance and Sexual Activity   • Alcohol use: Never   • Drug use: Never   • Sexual activity: None   Other Topics Concern   • None   Social History Narrative   • None     Social Determinants of Health     Financial Resource Strain: Not on file   Food Insecurity: Not on file   Transportation Needs: Not on file   Physical Activity: Not on file   Stress: Not on file   Social Connections: Not on file   Intimate Partner Violence: Not on file   Housing Stability: Not on file       Subjective         Objective    Physical Exam:   Assessment Type: Assess only  General Appearance: Awake  Respiratory Pattern: Normal  Chest Assessment: Chest expansion symmetrical  Bilateral Breath Sounds: Diminished  R Breath Sounds: Clear, Diminished  L Breath Sounds: Clear, Diminished  O2 Device: NC    Vitals:  Blood pressure 130/75, pulse 77, temperature (!) 97 1 °F (36 2 °C), resp  rate 16, height 5' 8\" (1 727 m), weight (!) 234 kg (515 lb), SpO2 97 %  Imaging and other studies: I have personally reviewed pertinent reports        O2 Device: " NC     Plan             Resp Comments: pt resting in bed, no pmh of pulm disease, no home pulm meds, scheduled for home sleep study august protocol complete no indication for aerosol needed or requested at this time

## 2023-06-19 NOTE — ASSESSMENT & PLAN NOTE
· Status post ventral hernia repair with mesh  · Surgery is primary, continue post op care  · Diet changed to clears from NPO today  · Had 3 bowel movements overnight  · Improving

## 2023-06-19 NOTE — PLAN OF CARE
Problem: PAIN - ADULT  Goal: Verbalizes/displays adequate comfort level or baseline comfort level  Description: Interventions:  - Encourage patient to monitor pain and request assistance  - Assess pain using appropriate pain scale  - Administer analgesics based on type and severity of pain and evaluate response  - Implement non-pharmacological measures as appropriate and evaluate response  - Consider cultural and social influences on pain and pain management  - Notify physician/advanced practitioner if interventions unsuccessful or patient reports new pain  Outcome: Progressing     Problem: INFECTION - ADULT  Goal: Absence or prevention of progression during hospitalization  Description: INTERVENTIONS:  - Assess and monitor for signs and symptoms of infection  - Monitor lab/diagnostic results  - Monitor all insertion sites, i e  indwelling lines, tubes, and drains  - Monitor endotracheal if appropriate and nasal secretions for changes in amount and color  - [place] appropriate cooling/warming therapies per order  - Administer medications as ordered  - Instruct and encourage patient and family to use good hand hygiene technique  - Identify and instruct in appropriate isolation precautions for identified infection/condition  Outcome: Progressing  Goal: Absence of fever/infection during neutropenic period  Description: INTERVENTIONS:  - Monitor WBC    Outcome: Progressing     Problem: SAFETY ADULT  Goal: Patient will remain free of falls  Description: INTERVENTIONS:  - Educate patient/family on patient safety including physical limitations  - Instruct patient to call for assistance with activity   - Consult OT/PT to assist with strengthening/mobility   - Keep Call bell within reach  - Keep bed low and locked with side rails adjusted as appropriate  - Keep care items and personal belongings within reach  - Initiate and maintain comfort rounds  - Make Fall Risk Sign visible to staff  - Offer Toileting every 2 Hours, in advance of need  - Initiate/Maintain bed alarm  - Obtain necessary fall risk management equipment: call bell within research  - Apply yellow socks and bracelet for high fall risk patients  - Consider moving patient to room near nurses station  Outcome: Progressing  Goal: Maintain or return to baseline ADL function  Description: INTERVENTIONS:  -  Assess patient's ability to carry out ADLs; assess patient's baseline for ADL function and identify physical deficits which impact ability to perform ADLs (bathing, care of mouth/teeth, toileting, grooming, dressing, etc )  - Assess/evaluate cause of self-care deficits   - Assess range of motion  - Assess patient's mobility; develop plan if impaired  - Assess patient's need for assistive devices and provide as appropriate  - Encourage maximum independence but intervene and supervise when necessary  - Involve family in performance of ADLs  - Assess for home care needs following discharge   - Consider OT consult to assist with ADL evaluation and planning for discharge  - Provide patient education as appropriate  Outcome: Progressing  Goal: Maintains/Returns to pre admission functional level  Description: INTERVENTIONS:  - Perform BMAT or MOVE assessment daily    - Set and communicate daily mobility goal to care team and patient/family/caregiver  - Collaborate with rehabilitation services on mobility goals if consulted  - Perform Range of Motion 3 times a day  - Reposition patient every 2 hours    - Dangle patient 3 times a day  - Stand patient 3 times a day  - Ambulate patient 3 times a day  - Out of bed to chair 3 times a day   - Out of bed for meals 3 times a day  - Out of bed for toileting  - Record patient progress and toleration of activity level   Outcome: Progressing     Problem: DISCHARGE PLANNING  Goal: Discharge to home or other facility with appropriate resources  Description: INTERVENTIONS:  - Identify barriers to discharge w/patient and caregiver  - Arrange for needed discharge resources and transportation as appropriate  - Identify discharge learning needs (meds, wound care, etc )  - Arrange for interpretive services to assist at discharge as needed  - Refer to Case Management Department for coordinating discharge planning if the patient needs post-hospital services based on physician/advanced practitioner order or complex needs related to functional status, cognitive ability, or social support system  Outcome: Progressing     Problem: Knowledge Deficit  Goal: Patient/family/caregiver demonstrates understanding of disease process, treatment plan, medications, and discharge instructions  Description: Complete learning assessment and assess knowledge base    Interventions:  - Provide teaching at level of understanding  - Provide teaching via preferred learning methods  Outcome: Progressing

## 2023-06-19 NOTE — ASSESSMENT & PLAN NOTE
· Post op hypoxia and wheezing  · No history of pulmonary disease  · Currently on 3 liters nasal canula  · Monitor oxygen status   · Continue nebulizers and add IV solumedrol today

## 2023-06-20 NOTE — UTILIZATION REVIEW
NOTIFICATION OF ADMISSION DISCHARGE   This is a Notification of Discharge from [facility]  Please be advised that this patient has been discharge from our facility  Below you will find the admission and discharge date and time including the patient’s disposition  UTILIZATION REVIEW CONTACT:  Jenna Westbrook  Utilization   Network Utilization Review Department  Phone: [phone] x carefully listen to the prompts  All voicemails are confidential   Email: [email or web address]     ADMISSION INFORMATION  PRESENTATION DATE: 6/17/2023 11:35 AM  OBERVATION ADMISSION DATE:   INPATIENT ADMISSION DATE: 6/17/23  5:02 PM   DISCHARGE DATE: 6/19/2023  5:04 PM   DISPOSITION:Home/Self Care    IMPORTANT INFORMATION:  Send all requests for admission clinical reviews, approved or denied determinations and any other requests to dedicated fax number below belonging to the campus where the patient is receiving treatment   List of dedicated fax numbers:  [address] DENIALS (Administrative/Medical Necessity) [phone]   [address] (Maternity/NICU/Pediatrics) [phone]   [facility] [phone]   [address] [phone]   [address] [phone]   220 [facility] [phone] 90 [facility] [phone]   [address] [place] [address] 119 [phone]   [facility]  [phone] 9848 [facility] [phone]   412 [facility] 850 [address] [facility] [phone]

## 2023-06-21 LAB
ATRIAL RATE: 79 BPM
P AXIS: 11 DEGREES
PR INTERVAL: 178 MS
QRS AXIS: -4 DEGREES
QRSD INTERVAL: 82 MS
QT INTERVAL: 356 MS
QTC INTERVAL: 408 MS
T WAVE AXIS: 56 DEGREES
VENTRICULAR RATE: 79 BPM

## 2023-06-21 PROCEDURE — 93010 ELECTROCARDIOGRAM REPORT: CPT | Performed by: INTERNAL MEDICINE

## 2023-06-22 NOTE — DISCHARGE SUMMARY
"Discharge Summary - Cameron Lubin 50 y o  male MRN: [id number]    Unit/Bed#: -01 Encounter: [id number]    Admission Date: 6/17/2023   Discharge Date:06/19/23  Admitting Diagnosis:   Morbid obesity ([address] ) [E66 01]  Abdominal pain [R10 9]  Constipation [K59 00]  Large bowel obstruction ([address] ) [X12 630]  Right lower quadrant abdominal pain [R10 31]  Ventral hernia with bowel obstruction [K43 6]    Principle/ Secondary Diagnosis:  Past Medical History:   Diagnosis Date   • Cancer ([address] )     skin cancer on neck   • Hypertension      Past Surgical History:   Procedure Laterality Date   • VENTRAL HERNIA REPAIR N/A 6/18/2023    Procedure: REPAIR HERNIA VENTRAL, laparoscopic; Surgeon: Juan Guzmán MD;  Location: [facility];  Service: General     Discharge Diagnoses: Active Problems:    HTN (hypertension)    Anxiety    Type 2 diabetes mellitus without complication, without long-term current use of insulin (HCC)    Morbid obesity ([address] )    Wheezing      Procedures Performed:  REPAIR HERNIA VENTRAL, laparoscopic (Abdomen)  Procedure(s):  REPAIR HERNIA VENTRAL, laparoscopic    Consultants:   SLIM    History of Present Illness:  Per H&P completed on 6/18/23: \"Mariano Lubin is a 50y o  year old male with PMHx of hypertension and morbid obesity who presented with right lower quadrant and lower abdominal pain, and inability to have bowel movements  Patient states his abdominal pain started Thursday and persisted throughout Friday  He tried bowel regimen to help and thought he had constipation  He also had persistent lower abdominal pain  When his symptoms were not improving and he still has not have any bowel movements yesterday he decided to come to the emergency department  He is passing some flatus  He denies any chest pain or shortness of breath  He denies any fevers, chills, nausea or vomiting  He denies any pain of this nature in the past   He denies any previous knowledge of abdominal hernias    He states he is " "normally active at home  He has been on Ozempic for the past 2 weeks and has lost 25 pounds  Last dose of Ozempic was on Monday, 6/12/2023  He has changed his diet and has been eating more healthy with more fiber  He has never had any abdominal surgeries in the past \"    Hospital Course: Jeffrey Quiroz is a 50 y o  male admitted for large bowel obstruction from incarcerated ventral hernia  He underwent laparoscopic hernia repair with Dr Hi Beckett on 6/18/2023  [facility] He tolerated the procedure well and there were no complications  Please see full dictated operative report for further details  Wilson catheter was placed intraoperatively and was removed postop  Patient voided without difficulty after Wilson removal   He was kept overnight for monitoring vitals, advancement of diet, and pain control  In the morning he was passing flatus and had bowel movements  His diet was advanced to clear liquid and was advanced as tolerated  He was afebrile and surgical pain was well controlled with medications  He tolerated a diet with no nausea/vomiting  He was ambulating and urinating without difficulty, and passing flatus  Incisions were c/d/i, no drainage or surrounding erythema  He was deemed appropriate for discharge  The patient was discharge with plans to follow up in 2 weeks  Discharge instructions were discussed with pt and all questions answered  Rx for pain medication was provided  The patient was seen and examined on the day of discharge:   /66   Pulse 99   Temp 97 7 °F (36 5 °C)   Resp 16   Ht 5' 8\" (1 727 m)   Wt (!) 234 kg (515 lb)   SpO2 92%   BMI 78 31 kg/m²   Physical Exam  Vitals reviewed  Constitutional:       General: He is not in acute distress  Appearance: Normal appearance  He is obese  He is not ill-appearing  HENT:      Head: Normocephalic and atraumatic  Nose: Nose normal  No congestion        Mouth/Throat:      Mouth: Mucous membranes are moist       Pharynx: No " oropharyngeal exudate  Eyes:      General: No scleral icterus  Extraocular Movements: Extraocular movements intact  Conjunctiva/sclera: Conjunctivae normal    Cardiovascular:      Rate and Rhythm: Normal rate and regular rhythm  Pulses: Normal pulses  Heart sounds: Normal heart sounds  Pulmonary:      Effort: Pulmonary effort is normal  No respiratory distress  Breath sounds: Normal breath sounds  Abdominal:      General: Bowel sounds are normal  There is no distension  Palpations: Abdomen is soft  Tenderness: There is abdominal tenderness (appropriate at incision sites)  There is no guarding  Hernia: A hernia is present  Comments: Incisions with dressings in place, c/d/i, no fluctuance or erythema   Musculoskeletal:         General: No tenderness  Normal range of motion  Cervical back: Normal range of motion and neck supple  Right lower leg: Edema present  Left lower leg: Edema present  Skin:     General: Skin is warm and dry  Capillary Refill: Capillary refill takes less than 2 seconds  Coloration: Skin is not jaundiced  Findings: No lesion  Neurological:      General: No focal deficit present  Mental Status: He is alert and oriented to person, place, and time  Psychiatric:         Mood and Affect: Mood normal          Behavior: Behavior normal        Condition at Discharge: stable     Discharge instructions/Information to patient and family:   See after visit summary for information provided to patient and family  Provisions for Follow-Up Care:  See after visit summary for information related to follow-up care and any pertinent home health orders  Disposition: Home    Planned Readmission: No    Discharge Statement   I spent 30 minutes discharging the patient  This time was spent on the day of discharge  I had direct contact with the patient on the day of discharge   Additional documentation is required if more than 30 minutes were spent on discharge  Discharge Medications:  See after visit summary for reconciled discharge medications provided to patient and family        Veronica Dunne PA-C  6/22/2023

## 2023-07-06 ENCOUNTER — OFFICE VISIT (OUTPATIENT)
Dept: SURGERY | Facility: CLINIC | Age: 48
End: 2023-07-06

## 2023-07-06 VITALS
DIASTOLIC BLOOD PRESSURE: 80 MMHG | TEMPERATURE: 97.8 F | WEIGHT: 315 LBS | OXYGEN SATURATION: 95 % | SYSTOLIC BLOOD PRESSURE: 144 MMHG | RESPIRATION RATE: 16 BRPM | HEART RATE: 100 BPM | HEIGHT: 68 IN | BODY MASS INDEX: 47.74 KG/M2

## 2023-07-06 DIAGNOSIS — E11.69 TYPE 2 DIABETES MELLITUS WITH OTHER SPECIFIED COMPLICATION, WITHOUT LONG-TERM CURRENT USE OF INSULIN (HCC): ICD-10-CM

## 2023-07-06 DIAGNOSIS — Z48.89 POSTOPERATIVE VISIT: Primary | ICD-10-CM

## 2023-07-06 PROCEDURE — 99024 POSTOP FOLLOW-UP VISIT: CPT | Performed by: SURGERY

## 2023-07-06 RX ORDER — SEMAGLUTIDE 0.68 MG/ML
INJECTION, SOLUTION SUBCUTANEOUS
COMMUNITY
Start: 2023-06-26

## 2023-07-06 RX ORDER — DOXYCYCLINE 150 MG/1
150 TABLET ORAL 2 TIMES DAILY
Qty: 10 TABLET | Refills: 0 | Status: SHIPPED | OUTPATIENT
Start: 2023-07-06 | End: 2023-07-11

## 2023-07-06 NOTE — PROGRESS NOTES
Post-Op Follow Up- General Surgery   Matt Lubin 50 y.o. male MRN: [id number]  Unit/Bed#:  Encounter: [id number]    Assessment/Plan     Assessment:  Status post laparoscopic umbilical hernia repair for incarcerated umbilical hernia with obstruction, improved  Plan:  Patient has a superficial wound infection, this will be treated with doxycycline and warm compresses. The patient was advised to contact our office with worsening of the symptoms, otherwise he is discharged from my care and will be glad to see him if any problem arises in the future. History of Present Illness     HPI:  Radha Squires is a 50 y.o. male who presents to my office for first postop follow-up after laparoscopic umbilical hernia repair for incarcerated umbilical hernia with small bowel obstruction from June 18. The patient stated doing well, tolerating diet and having regular bowel movement. The patient denies having any fever, chills, nausea, vomiting, diarrhea, constipation or abdominal pain. Historical Information   Past Medical History:   Diagnosis Date   • Cancer ([address])     skin cancer on neck   • Hypertension      Past Surgical History:   Procedure Laterality Date   • VENTRAL HERNIA REPAIR N/A 6/18/2023    Procedure: REPAIR HERNIA VENTRAL, laparoscopic;   Surgeon: Eric Silva MD;  Location: [facility];  Service: General     Social History   Social History     Substance and Sexual Activity   Alcohol Use Never     Social History     Substance and Sexual Activity   Drug Use Never     Social History     Tobacco Use   Smoking Status Never   Smokeless Tobacco Never     Family History: non-contributory    Meds/Allergies   all medications and allergies reviewed     Current Outpatient Medications:   •  buPROPion (WELLBUTRIN) 100 mg tablet, Take 100 mg by mouth 2 (two) times a day, Disp: , Rfl:   •  metFORMIN (GLUCOPHAGE) 500 mg tablet, Take 500 mg by mouth 2 (two) times a day, Disp: , Rfl:   •  Ozempic, 0.25 or 0.5 MG/DOSE, 2 MG/3ML injection pen, INJECT 0.5 MG UNDER THE SKIN EVERY WEEK FOR 28 DAYS, Disp: , Rfl:   •  telmisartan-hydrochlorothiazide (MICARDIS HCT) 40-12.5 MG per tablet, Take 1 tablet by mouth daily, Disp: , Rfl:   •  torsemide (DEMADEX) 100 mg tablet, Take 20 mg by mouth daily, Disp: , Rfl:   •  doxycycline (ADOXA) 150 MG tablet, Take 1 tablet (150 mg total) by mouth 2 (two) times a day for 5 days, Disp: 10 tablet, Rfl: 0  •  metFORMIN (GLUCOPHAGE-XR) 500 mg 24 hr tablet, Take 500 mg by mouth daily with breakfast (Patient not taking: Reported on 7/6/2023), Disp: , Rfl:   •  semaglutide, 0.25 or 0.5 mg/dose, (Ozempic) 2 mg/1.5 mL injection pen, Inject 0.5 mg under the skin every 7 days (Patient not taking: Reported on 7/6/2023), Disp: , Rfl:   No Known Allergies    Objective     Current Vitals:   Blood Pressure: 144/80 (07/06/23 0914)  Pulse: 100 (07/06/23 0914)  Temperature: 97.8 °F (36.6 °C) (07/06/23 0914)  Respirations: 16 (07/06/23 0914)  Height: 5' 8" (172.7 cm) (07/06/23 0914)  Weight - Scale: (!) 237 kg (521 lb 9.6 oz) (07/06/23 0914)  SpO2: 95 % (07/06/23 0914)    Physical Exam  Vitals and nursing note reviewed. Constitutional:       Appearance: He is obese. Abdominal:      Comments: Abdomen is obese, soft, nondistended nontender. Right sided laparoscopic incision somewhat red, no drainage, somewhat indurated. The rest of the incisions are completely healed.

## 2024-08-25 ENCOUNTER — APPOINTMENT (EMERGENCY)
Dept: CT IMAGING | Facility: HOSPITAL | Age: 49
DRG: 394 | End: 2024-08-25
Payer: COMMERCIAL

## 2024-08-25 ENCOUNTER — HOSPITAL ENCOUNTER (INPATIENT)
Facility: HOSPITAL | Age: 49
LOS: 3 days | Discharge: HOME | DRG: 394 | End: 2024-08-28
Attending: EMERGENCY MEDICINE | Admitting: SURGERY
Payer: COMMERCIAL

## 2024-08-25 DIAGNOSIS — K56.609 SBO (SMALL BOWEL OBSTRUCTION) (HCC): ICD-10-CM

## 2024-08-25 DIAGNOSIS — K46.0 INCARCERATED HERNIA: Primary | ICD-10-CM

## 2024-08-25 DIAGNOSIS — I10 PRIMARY HYPERTENSION: ICD-10-CM

## 2024-08-25 DIAGNOSIS — E11.69 TYPE 2 DIABETES MELLITUS WITH OTHER SPECIFIED COMPLICATION, WITHOUT LONG-TERM CURRENT USE OF INSULIN (HCC): ICD-10-CM

## 2024-08-25 LAB
ALBUMIN SERPL BCG-MCNC: 4.2 G/DL (ref 3.5–5)
ALP SERPL-CCNC: 66 U/L (ref 34–104)
ALT SERPL W P-5'-P-CCNC: 18 U/L (ref 7–52)
ANION GAP SERPL CALCULATED.3IONS-SCNC: 10 MMOL/L (ref 4–13)
AST SERPL W P-5'-P-CCNC: 18 U/L (ref 13–39)
ATRIAL RATE: 99 BPM
BASOPHILS # BLD AUTO: 0.03 THOUSANDS/ÂΜL (ref 0–0.1)
BASOPHILS NFR BLD AUTO: 0 % (ref 0–1)
BILIRUB SERPL-MCNC: 0.64 MG/DL (ref 0.2–1)
BUN SERPL-MCNC: 20 MG/DL (ref 5–25)
CALCIUM SERPL-MCNC: 9.7 MG/DL (ref 8.4–10.2)
CHLORIDE SERPL-SCNC: 100 MMOL/L (ref 96–108)
CO2 SERPL-SCNC: 29 MMOL/L (ref 21–32)
CREAT SERPL-MCNC: 1.13 MG/DL (ref 0.6–1.3)
EOSINOPHIL # BLD AUTO: 0.05 THOUSAND/ÂΜL (ref 0–0.61)
EOSINOPHIL NFR BLD AUTO: 0 % (ref 0–6)
ERYTHROCYTE [DISTWIDTH] IN BLOOD BY AUTOMATED COUNT: 15.5 % (ref 11.6–15.1)
GFR SERPL CREATININE-BSD FRML MDRD: 74 ML/MIN/1.73SQ M
GLUCOSE SERPL-MCNC: 150 MG/DL (ref 65–140)
HCT VFR BLD AUTO: 42 % (ref 36.5–49.3)
HGB BLD-MCNC: 13.8 G/DL (ref 12–17)
IMM GRANULOCYTES # BLD AUTO: 0.05 THOUSAND/UL (ref 0–0.2)
IMM GRANULOCYTES NFR BLD AUTO: 0 % (ref 0–2)
LACTATE SERPL-SCNC: 1.9 MMOL/L (ref 0.5–2)
LIPASE SERPL-CCNC: 21 U/L (ref 11–82)
LYMPHOCYTES # BLD AUTO: 0.81 THOUSANDS/ÂΜL (ref 0.6–4.47)
LYMPHOCYTES NFR BLD AUTO: 5 % (ref 14–44)
MCH RBC QN AUTO: 29.4 PG (ref 26.8–34.3)
MCHC RBC AUTO-ENTMCNC: 32.9 G/DL (ref 31.4–37.4)
MCV RBC AUTO: 90 FL (ref 82–98)
MONOCYTES # BLD AUTO: 0.83 THOUSAND/ÂΜL (ref 0.17–1.22)
MONOCYTES NFR BLD AUTO: 5 % (ref 4–12)
NEUTROPHILS # BLD AUTO: 14.44 THOUSANDS/ÂΜL (ref 1.85–7.62)
NEUTS SEG NFR BLD AUTO: 90 % (ref 43–75)
NRBC BLD AUTO-RTO: 0 /100 WBCS
P AXIS: 70 DEGREES
PLATELET # BLD AUTO: 360 THOUSANDS/UL (ref 149–390)
PMV BLD AUTO: 8.8 FL (ref 8.9–12.7)
POTASSIUM SERPL-SCNC: 4.2 MMOL/L (ref 3.5–5.3)
PR INTERVAL: 206 MS
PROT SERPL-MCNC: 7.9 G/DL (ref 6.4–8.4)
QRS AXIS: -15 DEGREES
QRSD INTERVAL: 76 MS
QT INTERVAL: 328 MS
QTC INTERVAL: 420 MS
RBC # BLD AUTO: 4.69 MILLION/UL (ref 3.88–5.62)
SODIUM SERPL-SCNC: 139 MMOL/L (ref 135–147)
T WAVE AXIS: 39 DEGREES
VENTRICULAR RATE: 99 BPM
WBC # BLD AUTO: 16.21 THOUSAND/UL (ref 4.31–10.16)

## 2024-08-25 PROCEDURE — 83605 ASSAY OF LACTIC ACID: CPT | Performed by: EMERGENCY MEDICINE

## 2024-08-25 PROCEDURE — 99285 EMERGENCY DEPT VISIT HI MDM: CPT | Performed by: EMERGENCY MEDICINE

## 2024-08-25 PROCEDURE — 74177 CT ABD & PELVIS W/CONTRAST: CPT

## 2024-08-25 PROCEDURE — 80053 COMPREHEN METABOLIC PANEL: CPT | Performed by: EMERGENCY MEDICINE

## 2024-08-25 PROCEDURE — 96361 HYDRATE IV INFUSION ADD-ON: CPT

## 2024-08-25 PROCEDURE — 99285 EMERGENCY DEPT VISIT HI MDM: CPT

## 2024-08-25 PROCEDURE — 96376 TX/PRO/DX INJ SAME DRUG ADON: CPT

## 2024-08-25 PROCEDURE — 85025 COMPLETE CBC W/AUTO DIFF WBC: CPT | Performed by: EMERGENCY MEDICINE

## 2024-08-25 PROCEDURE — 96375 TX/PRO/DX INJ NEW DRUG ADDON: CPT

## 2024-08-25 PROCEDURE — 83690 ASSAY OF LIPASE: CPT | Performed by: EMERGENCY MEDICINE

## 2024-08-25 PROCEDURE — 93010 ELECTROCARDIOGRAM REPORT: CPT | Performed by: INTERNAL MEDICINE

## 2024-08-25 PROCEDURE — 96374 THER/PROPH/DIAG INJ IV PUSH: CPT

## 2024-08-25 PROCEDURE — 93005 ELECTROCARDIOGRAM TRACING: CPT

## 2024-08-25 PROCEDURE — 36415 COLL VENOUS BLD VENIPUNCTURE: CPT | Performed by: EMERGENCY MEDICINE

## 2024-08-25 PROCEDURE — 99223 1ST HOSP IP/OBS HIGH 75: CPT | Performed by: SURGERY

## 2024-08-25 RX ORDER — METRONIDAZOLE 500 MG/100ML
500 INJECTION, SOLUTION INTRAVENOUS EVERY 8 HOURS
Status: DISCONTINUED | OUTPATIENT
Start: 2024-08-25 | End: 2024-08-27

## 2024-08-25 RX ORDER — ONDANSETRON 2 MG/ML
4 INJECTION INTRAMUSCULAR; INTRAVENOUS EVERY 6 HOURS PRN
Status: DISCONTINUED | OUTPATIENT
Start: 2024-08-25 | End: 2024-08-28 | Stop reason: HOSPADM

## 2024-08-25 RX ORDER — HYDROMORPHONE HCL/PF 1 MG/ML
1 SYRINGE (ML) INJECTION ONCE
Status: COMPLETED | OUTPATIENT
Start: 2024-08-25 | End: 2024-08-25

## 2024-08-25 RX ORDER — OXYMETAZOLINE HYDROCHLORIDE 0.05 G/100ML
2 SPRAY NASAL
Status: COMPLETED | OUTPATIENT
Start: 2024-08-25 | End: 2024-08-27

## 2024-08-25 RX ORDER — CEFAZOLIN SODIUM 2 G/50ML
2000 SOLUTION INTRAVENOUS EVERY 8 HOURS
Status: COMPLETED | OUTPATIENT
Start: 2024-08-25 | End: 2024-08-25

## 2024-08-25 RX ORDER — HEPARIN SODIUM 5000 [USP'U]/ML
5000 INJECTION, SOLUTION INTRAVENOUS; SUBCUTANEOUS EVERY 8 HOURS SCHEDULED
Status: DISCONTINUED | OUTPATIENT
Start: 2024-08-25 | End: 2024-08-27

## 2024-08-25 RX ORDER — PANTOPRAZOLE SODIUM 40 MG/10ML
40 INJECTION, POWDER, LYOPHILIZED, FOR SOLUTION INTRAVENOUS
Status: DISCONTINUED | OUTPATIENT
Start: 2024-08-25 | End: 2024-08-28 | Stop reason: HOSPADM

## 2024-08-25 RX ORDER — SODIUM CHLORIDE, SODIUM LACTATE, POTASSIUM CHLORIDE, CALCIUM CHLORIDE 600; 310; 30; 20 MG/100ML; MG/100ML; MG/100ML; MG/100ML
125 INJECTION, SOLUTION INTRAVENOUS CONTINUOUS
Status: DISCONTINUED | OUTPATIENT
Start: 2024-08-25 | End: 2024-08-27

## 2024-08-25 RX ORDER — ONDANSETRON 2 MG/ML
4 INJECTION INTRAMUSCULAR; INTRAVENOUS ONCE
Status: COMPLETED | OUTPATIENT
Start: 2024-08-25 | End: 2024-08-25

## 2024-08-25 RX ADMIN — HEPARIN SODIUM 5000 UNITS: 5000 INJECTION INTRAVENOUS; SUBCUTANEOUS at 14:32

## 2024-08-25 RX ADMIN — ONDANSETRON 4 MG: 2 INJECTION INTRAMUSCULAR; INTRAVENOUS at 09:21

## 2024-08-25 RX ADMIN — HYDROMORPHONE HYDROCHLORIDE 1 MG: 1 INJECTION, SOLUTION INTRAMUSCULAR; INTRAVENOUS; SUBCUTANEOUS at 06:59

## 2024-08-25 RX ADMIN — HYDROMORPHONE HYDROCHLORIDE 1 MG: 1 INJECTION, SOLUTION INTRAMUSCULAR; INTRAVENOUS; SUBCUTANEOUS at 09:22

## 2024-08-25 RX ADMIN — HYDROMORPHONE HYDROCHLORIDE 1 MG: 1 INJECTION, SOLUTION INTRAMUSCULAR; INTRAVENOUS; SUBCUTANEOUS at 10:26

## 2024-08-25 RX ADMIN — SODIUM CHLORIDE 1000 ML: 0.9 INJECTION, SOLUTION INTRAVENOUS at 06:59

## 2024-08-25 RX ADMIN — MORPHINE SULFATE 2 MG: 2 INJECTION, SOLUTION INTRAMUSCULAR; INTRAVENOUS at 14:57

## 2024-08-25 RX ADMIN — MORPHINE SULFATE 2 MG: 2 INJECTION, SOLUTION INTRAMUSCULAR; INTRAVENOUS at 23:47

## 2024-08-25 RX ADMIN — OXYMETAZOLINE HYDROCHLORIDE 2 SPRAY: 0.5 SPRAY NASAL at 22:21

## 2024-08-25 RX ADMIN — ONDANSETRON 4 MG: 2 INJECTION INTRAMUSCULAR; INTRAVENOUS at 06:58

## 2024-08-25 RX ADMIN — ONDANSETRON 4 MG: 2 INJECTION INTRAMUSCULAR; INTRAVENOUS at 14:32

## 2024-08-25 RX ADMIN — SODIUM CHLORIDE, SODIUM LACTATE, POTASSIUM CHLORIDE, AND CALCIUM CHLORIDE 125 ML/HR: .6; .31; .03; .02 INJECTION, SOLUTION INTRAVENOUS at 14:32

## 2024-08-25 RX ADMIN — MORPHINE SULFATE 2 MG: 2 INJECTION, SOLUTION INTRAMUSCULAR; INTRAVENOUS at 17:06

## 2024-08-25 RX ADMIN — HEPARIN SODIUM 5000 UNITS: 5000 INJECTION INTRAVENOUS; SUBCUTANEOUS at 21:33

## 2024-08-25 RX ADMIN — ONDANSETRON 4 MG: 2 INJECTION INTRAMUSCULAR; INTRAVENOUS at 20:20

## 2024-08-25 RX ADMIN — METRONIDAZOLE 500 MG: 500 INJECTION, SOLUTION INTRAVENOUS at 21:31

## 2024-08-25 RX ADMIN — MORPHINE SULFATE 2 MG: 2 INJECTION, SOLUTION INTRAMUSCULAR; INTRAVENOUS at 19:06

## 2024-08-25 RX ADMIN — MORPHINE SULFATE 2 MG: 2 INJECTION, SOLUTION INTRAMUSCULAR; INTRAVENOUS at 21:40

## 2024-08-25 RX ADMIN — MORPHINE SULFATE 2 MG: 2 INJECTION, SOLUTION INTRAMUSCULAR; INTRAVENOUS at 12:47

## 2024-08-25 RX ADMIN — IOHEXOL 100 ML: 350 INJECTION, SOLUTION INTRAVENOUS at 11:10

## 2024-08-25 RX ADMIN — METRONIDAZOLE 500 MG: 500 INJECTION, SOLUTION INTRAVENOUS at 14:31

## 2024-08-25 RX ADMIN — CEFAZOLIN SODIUM 2000 MG: 2 SOLUTION INTRAVENOUS at 15:24

## 2024-08-25 RX ADMIN — PANTOPRAZOLE SODIUM 40 MG: 40 INJECTION, POWDER, FOR SOLUTION INTRAVENOUS at 14:32

## 2024-08-25 NOTE — LETTER
[facility] 4TH FLOOR MED SURG UNIT  [address]  [place] [address]  Dept: [phone]    August 28, 2024     Patient: Mariano Lubin   YOB: 1975   Date of Visit: 8/25/2024       To Whom it May Concern:    Mariano Lubin is under my professional care. He was seen in the hospital from 8/25/2024 to 08/28/24. He may return to work on Monday, September 2, 2024 with the following limitations of no heavy lifting more than 20lbs .    If you have any questions or concerns, please don't hesitate to call.  Dr. Ihsan Whitaker  [phone]       Sincerely,          Maria Isabel Daigle PA-C

## 2024-08-25 NOTE — PLAN OF CARE
Problem: PAIN - ADULT  Goal: Verbalizes/displays adequate comfort level or baseline comfort level  Description: Interventions:  - Encourage patient to monitor pain and request assistance  - Assess pain using appropriate pain scale  - Administer analgesics based on type and severity of pain and evaluate response  - Implement non-pharmacological measures as appropriate and evaluate response  - Consider cultural and social influences on pain and pain management  - Notify physician/advanced practitioner if interventions unsuccessful or patient reports new pain  Outcome: Progressing     Problem: INFECTION - ADULT  Goal: Absence or prevention of progression during hospitalization  Description: INTERVENTIONS:  - Assess and monitor for signs and symptoms of infection  - Monitor lab/diagnostic results  - Monitor all insertion sites, i.e. indwelling lines, tubes, and drains  - Monitor endotracheal if appropriate and nasal secretions for changes in amount and color  - [place] appropriate cooling/warming therapies per order  - Administer medications as ordered  - Instruct and encourage patient and family to use good hand hygiene technique  - Identify and instruct in appropriate isolation precautions for identified infection/condition  Outcome: Progressing  Goal: Absence of fever/infection during neutropenic period  Description: INTERVENTIONS:  - Monitor WBC    Outcome: Progressing     Problem: SAFETY ADULT  Goal: Patient will remain free of falls  Description: INTERVENTIONS:  - Educate patient/family on patient safety including physical limitations  - Instruct patient to call for assistance with activity   - Consult OT/PT to assist with strengthening/mobility   - Keep Call bell within reach  - Keep bed low and locked with side rails adjusted as appropriate  - Keep care items and personal belongings within reach  - Initiate and maintain comfort rounds  - Make Fall Risk Sign visible to staff  - Offer Toileting every 2 Hours,  in advance of need  - Initiate/Maintain bd alarm  - Obtain necessary fall risk management equipment: chair alarm  - Apply yellow socks and bracelet for high fall risk patients  - Consider moving patient to room near nurses station  Outcome: Progressing  Goal: Maintain or return to baseline ADL function  Description: INTERVENTIONS:  -  Assess patient's ability to carry out ADLs; assess patient's baseline for ADL function and identify physical deficits which impact ability to perform ADLs (bathing, care of mouth/teeth, toileting, grooming, dressing, etc.)  - Assess/evaluate cause of self-care deficits   - Assess range of motion  - Assess patient's mobility; develop plan if impaired  - Assess patient's need for assistive devices and provide as appropriate  - Encourage maximum independence but intervene and supervise when necessary  - Involve family in performance of ADLs  - Assess for home care needs following discharge   - Consider OT consult to assist with ADL evaluation and planning for discharge  - Provide patient education as appropriate  Outcome: Progressing  Goal: Maintains/Returns to pre admission functional level  Description: INTERVENTIONS:  - Perform AM-PAC 6 Click Basic Mobility/ Daily Activity assessment daily.  - Set and communicate daily mobility goal to care team and patient/family/caregiver.   - Collaborate with rehabilitation services on mobility goals if consulted  - Perform Range of Motion 3 times a day.  - Reposition patient every 3 hours.  - Dangle patient 3 times a day  - Stand patient 3 times a day  - Ambulate patient 3 times a day  - Out of bed to chair 3 times a day   - Out of bed for meals 3 times a day  - Out of bed for toileting  - Record patient progress and toleration of activity level   Outcome: Progressing     Problem: DISCHARGE PLANNING  Goal: Discharge to home or other facility with appropriate resources  Description: INTERVENTIONS:  - Identify barriers to discharge w/patient and  caregiver  - Arrange for needed discharge resources and transportation as appropriate  - Identify discharge learning needs (meds, wound care, etc.)  - Arrange for interpretive services to assist at discharge as needed  - Refer to Case Management Department for coordinating discharge planning if the patient needs post-hospital services based on physician/advanced practitioner order or complex needs related to functional status, cognitive ability, or social support system  Outcome: Progressing     Problem: Knowledge Deficit  Goal: Patient/family/caregiver demonstrates understanding of disease process, treatment plan, medications, and discharge instructions  Description: Complete learning assessment and assess knowledge base.  Interventions:  - Provide teaching at level of understanding  - Provide teaching via preferred learning methods  Outcome: Progressing     Problem: GASTROINTESTINAL - ADULT  Goal: Minimal or absence of nausea and/or vomiting  Description: INTERVENTIONS:  - Administer IV fluids if ordered to ensure adequate hydration  - Maintain NPO status until nausea and vomiting are resolved  - Nasogastric tube if ordered  - Administer ordered antiemetic medications as needed  - Provide nonpharmacologic comfort measures as appropriate  - Advance diet as tolerated, if ordered  - Consider nutrition services referral to assist patient with adequate nutrition and appropriate food choices  Outcome: Progressing  Goal: Maintains or returns to baseline bowel function  Description: INTERVENTIONS:  - Assess bowel function  - Encourage oral fluids to ensure adequate hydration  - Administer IV fluids if ordered to ensure adequate hydration  - Administer ordered medications as needed  - Encourage mobilization and activity  - Consider nutritional services referral to assist patient with adequate nutrition and appropriate food choices  Outcome: Progressing  Goal: Maintains adequate nutritional intake  Description:  INTERVENTIONS:  - Monitor percentage of each meal consumed  - Identify factors contributing to decreased intake, treat as appropriate  - Assist with meals as needed  - Monitor I&O, weight, and lab values if indicated  - Obtain nutrition services referral as needed  Outcome: Progressing     Problem: METABOLIC, FLUID AND ELECTROLYTES - ADULT  Goal: Electrolytes maintained within normal limits  Description: INTERVENTIONS:  - Monitor labs and assess patient for signs and symptoms of electrolyte imbalances  - Administer electrolyte replacement as ordered  - Monitor response to electrolyte replacements, including repeat lab results as appropriate  - Instruct patient on fluid and nutrition as appropriate  Outcome: Progressing  Goal: Fluid balance maintained  Description: INTERVENTIONS:  - Monitor labs   - Monitor I/O and WT  - Instruct patient on fluid and nutrition as appropriate  - Assess for signs & symptoms of volume excess or deficit  Outcome: Progressing     Problem: SKIN/TISSUE INTEGRITY - ADULT  Goal: Skin Integrity remains intact(Skin Breakdown Prevention)  Description: Assess:  -Perform Gilbert assessment every   -Clean and moisturize skin every   -Inspect skin when repositioning, toileting, and assisting with ADLS  -Assess under medical devices such as  every   -Assess extremities for adequate circulation and sensation     Bed Management:  -Have minimal linens on bed & keep smooth, unwrinkled  -Change linens as needed when moist or perspiring  -Avoid sitting or lying in one position for more than  hours while in bed  -Keep HOB at degrees     Toileting:  -Offer bedside commode  -Assess for incontinence every   -Use incontinent care products after each incontinent episode such as     Activity:  -Mobilize patient  times a day  -Encourage activity and walks on unit  -Encourage or provide ROM exercises   -Turn and reposition patient every  Hours  -Use appropriate equipment to lift or move patient in bed  -Instruct/  Assist with weight shifting every  when out of bed in chair  -Consider limitation of chair time  hour intervals    Skin Care:  -Avoid use of baby powder, tape, friction and shearing, hot water or constrictive clothing  -Relieve pressure over bony prominences using   -Do not massage red bony areas    Next Steps:  -Teach patient strategies to minimize risks such as    -Consider consults to  interdisciplinary teams such as   Outcome: Progressing     Problem: HEMATOLOGIC - ADULT  Goal: Maintains hematologic stability  Description: INTERVENTIONS  - Assess for signs and symptoms of bleeding or hemorrhage  - Monitor labs  - Administer supportive blood products/factors as ordered and appropriate  Outcome: Progressing

## 2024-08-25 NOTE — EMTALA/ACUTE CARE TRANSFER
[facility] EMERGENCY DEPARTMENT  [address]  [place] [address]  Dept: [phone]      EMTALA TRANSFER CONSENT    NAME Mariano Lubin DOB 1975                              MRN [id number]    I have been informed of my rights regarding examination, treatment, and transfer   by Dr. Kia Torres,     Benefits: Specialized equipment and/or services available at the receiving facility (Include comment)________________________, Other benefits (Include comment)_______________________    Risks: Potential for delay in receiving treatment, Other: (Include comment)__________________________      Consent for Transfer:  I acknowledge that my medical condition has been evaluated and explained to me by the emergency department physician or other qualified medical person and/or my attending physician, who has recommended that I be transferred to the service of  Dr. Torres The above potential benefits of such transfer, the potential risks associated with such transfer, and the probable risks of not being transferred have been explained to me, and I fully understand them.  The doctor has explained that, in my case, the benefits of transfer outweigh the risks.  I agree to be transferred.    I authorize the performance of emergency medical procedures and treatments upon me in both transit and upon arrival at the receiving facility.  Additionally, I authorize the release of any and all medical records to the receiving facility and request they be transported with me, if possible.  I understand that the safest mode of transportation during a medical emergency is an ambulance and that the Hospital advocates the use of this mode of transport. Risks of traveling to the receiving facility by car, including absence of medical control, life sustaining equipment, such as oxygen, and medical personnel has been explained to me and I fully understand  them.    (HERNANDO CORRECT BOX BELOW)  [  ]  I consent to the stated transfer and to be transported by ambulance/helicopter.  [  ]  I consent to the stated transfer, but refuse transportation by ambulance and accept full responsibility for my transportation by car.  I understand the risks of non-ambulance transfers and I exonerate the Hospital and its staff from any deterioration in my condition that results from this refusal.    X___________________________________________    DATE  24  TIME________  Signature of patient or legally responsible individual signing on patient behalf           RELATIONSHIP TO PATIENT_________________________          Provider Certification    NAME Mariano Lubin                                         1975                              MRN [id number]    A medical screening exam was performed on the above named patient.  Based on the examination:    Condition Necessitating Transfer requires bariatric CT    Patient Condition: The patient has been stabilized such that within reasonable medical probability, no material deterioration of the patient condition or the condition of the unborn child(anna) is likely to result from the transfer    Reason for Transfer: Level of Care needed not available at this facility, Other (Include comment)____________________    Transfer Requirements: Facility [facility]   Space available and qualified personnel available for treatment as acknowledged by    Agreed to accept transfer and to provide appropriate medical treatment as acknowledged by       Melissa  Appropriate medical records of the examination and treatment of the patient are provided at the time of transfer   STAFF INITIAL WHEN COMPLETED _______  Transfer will be performed by qualified personnel from    and appropriate transfer equipment as required, including the use of necessary and appropriate life support measures.    Provider Certification: I have  examined the patient and explained the following risks and benefits of being transferred/refusing transfer to the patient/family:  General risk, such as traffic hazards, adverse weather conditions, rough terrain or turbulence, possible failure of equipment (including vehicle or aircraft), or consequences of actions of persons outside the control of the transport personnel, Unanticipated needs of medical equipment and personnel during transport, Risk of worsening condition, The possibility of a transport vehicle being unavailable      Based on these reasonable risks and benefits to the patient and/or the unborn child(anna), and based upon the information available at the time of the patient’s examination, I certify that the medical benefits reasonably to be expected from the provision of appropriate medical treatments at another medical facility outweigh the increasing risks, if any, to the individual’s medical condition, and in the case of labor to the unborn child, from effecting the transfer.    X____________________________________________ DATE 08/25/24        TIME_______      ORIGINAL - SEND TO MEDICAL RECORDS   COPY - SEND WITH PATIENT DURING TRANSFER

## 2024-08-25 NOTE — H&P
History and physical- General Surgery   Mariano Lubin 49 y.o. male MRN: [id number]  Unit/Bed#: ED 23 Encounter: [id number]    Assessment & Plan     Assessment:  Incarcerated ventral hernia, recurrent  Morbid obesity weight of 521 pounds  History of hypertension  History of skin cancer on neck  History of umbilical hernia repair with mesh 2023  Plan:  The patient will be admitted to the hospital, N.p.o., IV fluids, IV antibiotics and NG tube for decompression.      History of Present Illness     HPI:  Mariano Lubin is a 49 y.o. male who presents to the emergency room complaining of crampy abdominal pain and several episodes of nausea and vomiting since this morning.  Patient had a history of incarcerated umbilical hernia in 2023 for which he underwent laparoscopic repair with sutures due to the presence of colon within the hernia sac.  Patient noted a lump above the umbilicus a month later after surgery.  He was doing well until yesterday when he started complaining of pain in the abdomen associated with nausea and vomiting.  He denies having any chills, fever or any other constitutional symptoms.  He does not recall any single event that provoked the symptoms.  I review labs and CT scan report, films are not available in the computer.    Consults    Review of Systems  The rest of the review of system total of 10 were negative except for the HPI.    Historical Information   Past Medical History:   Diagnosis Date    Cancer (HCC)     skin cancer on neck    Hypertension      Past Surgical History:   Procedure Laterality Date    VENTRAL HERNIA REPAIR N/A 6/18/2023    Procedure: REPAIR HERNIA VENTRAL, laparoscopic;  Surgeon: Shay Elise MD;  Location: [facility];  Service: General     Social History   Social History     Substance and Sexual Activity   Alcohol Use Never     Social History     Substance and Sexual Activity   Drug Use Never     E-Cigarette/Vaping    E-Cigarette Use Never User      E-Cigarette/Vaping  Substances    Nicotine No     THC No     CBD No     Flavoring No     Other No     Unknown No      Social History     Tobacco Use   Smoking Status Never   Smokeless Tobacco Never     Family History: non-contributory    Meds/Allergies   all current active meds have been reviewed, current meds:   Current Facility-Administered Medications   Medication Dose Route Frequency    ceFAZolin (ANCEF) IVPB (premix in dextrose) 2,000 mg 50 mL  2,000 mg Intravenous Q8H    heparin (porcine) subcutaneous injection 5,000 Units  5,000 Units Subcutaneous Q8H TAI    lactated ringers infusion  125 mL/hr Intravenous Continuous    metroNIDAZOLE (FLAGYL) IVPB (premix) 500 mg 100 mL  500 mg Intravenous Q8H    morphine injection 2 mg  2 mg Intravenous Q2H PRN    ondansetron (ZOFRAN) injection 4 mg  4 mg Intravenous Q6H PRN    pantoprazole (PROTONIX) injection 40 mg  40 mg Intravenous Q24H TAI   , and PTA meds:   Prior to Admission Medications   Prescriptions Last Dose Informant Patient Reported? Taking?   Ozempic, 0.25 or 0.5 MG/DOSE, 2 MG/3ML injection pen  Self Yes No   Sig: INJECT 0.5 MG UNDER THE SKIN EVERY WEEK FOR 28 DAYS   buPROPion (WELLBUTRIN) 100 mg tablet  Self Yes No   Sig: Take 100 mg by mouth 2 (two) times a day   metFORMIN (GLUCOPHAGE) 500 mg tablet  Self Yes No   Sig: Take 500 mg by mouth 2 (two) times a day   metFORMIN (GLUCOPHAGE-XR) 500 mg 24 hr tablet  Self Yes No   Sig: Take 500 mg by mouth daily with breakfast   Patient not taking: Reported on 7/6/2023   telmisartan-hydrochlorothiazide (MICARDIS HCT) 40-12.5 MG per tablet  Self Yes No   Sig: Take 1 tablet by mouth daily   torsemide (DEMADEX) 100 mg tablet  Self Yes No   Sig: Take 20 mg by mouth daily      Facility-Administered Medications: None     No Known Allergies    Objective   First Vitals:   Blood Pressure: 123/60 (08/25/24 0626)  Pulse: 102 (08/25/24 0626)  Temperature: 97.9 °F (36.6 °C) (08/25/24 0626)  Temp Source: Temporal (08/25/24 0626)  Respirations: 18  (08/25/24 0626)  SpO2: 98 % (08/25/24 0626)    Current Vitals:   Blood Pressure: 142/67 (08/25/24 1000)  Pulse: 97 (08/25/24 1000)  Temperature: 97.9 °F (36.6 °C) (08/25/24 0626)  Temp Source: Temporal (08/25/24 0626)  Respirations: 16 (08/25/24 1000)  SpO2: 91 % (Pt O2 drops slightly when resting.) (08/25/24 1000)      Intake/Output Summary (Last 24 hours) at 8/25/2024 1316  Last data filed at 8/25/2024 0858  Gross per 24 hour   Intake 1000 ml   Output --   Net 1000 ml       Invasive Devices       Peripheral Intravenous Line  Duration             Peripheral IV 08/25/24 Left Antecubital <1 day                    Physical Exam  Vitals and nursing note reviewed.   Constitutional:       General: He is not in acute distress.     Appearance: He is obese.   Cardiovascular:      Rate and Rhythm: Normal rate and regular rhythm.   Pulmonary:      Effort: No respiratory distress.      Breath sounds: Normal breath sounds.   Abdominal:      Comments: Abdomen is massive, pendulous, moderate size lump in the supraumbilical area, nontender to palpation, unable to push it back in.  Unable to palpate visceromegaly or or any other mass in the abdominal wall.   Skin:     General: Skin is warm.      Coloration: Skin is not jaundiced.      Findings: No erythema or rash.   Neurological:      Mental Status: He is alert and oriented to person, place, and time.      Cranial Nerves: No cranial nerve deficit.   Psychiatric:         Mood and Affect: Mood normal.         Behavior: Behavior normal.     Lab Results: I have personally reviewed pertinent lab results.  , CBC:   Lab Results   Component Value Date    WBC 16.21 (H) 08/25/2024    HGB 13.8 08/25/2024    HCT 42.0 08/25/2024    MCV 90 08/25/2024     08/25/2024    RBC 4.69 08/25/2024    MCH 29.4 08/25/2024    MCHC 32.9 08/25/2024    RDW 15.5 (H) 08/25/2024    MPV 8.8 (L) 08/25/2024    NRBC 0 08/25/2024   , CMP:   Lab Results   Component Value Date    SODIUM 139 08/25/2024    K 4.2  "08/25/2024     08/25/2024    CO2 29 08/25/2024    BUN 20 08/25/2024    CREATININE 1.13 08/25/2024    CALCIUM 9.7 08/25/2024    AST 18 08/25/2024    ALT 18 08/25/2024    ALKPHOS 66 08/25/2024    EGFR 74 08/25/2024   , Coagulation: No results found for: \"PT\", \"INR\", \"APTT\", Urinalysis: No results found for: \"COLORU\", \"CLARITYU\", \"SPECGRAV\", \"PHUR\", \"LEUKOCYTESUR\", \"NITRITE\", \"PROTEINUA\", \"GLUCOSEU\", \"KETONESU\", \"BILIRUBINUR\", \"BLOODU\", Amylase: No results found for: \"AMYLASE\", Lipase:   Lab Results   Component Value Date    LIPASE 21 08/25/2024     Imaging: I have personally reviewed pertinent reports.    "

## 2024-08-25 NOTE — ED PROVIDER NOTES
History  Chief Complaint   Patient presents with    Abdominal Pain     Pt arrives ambulatory with a c/o ABD pain and N/V since yesterday morning as well as constipation. LBM 1.5 hours ago.     Patient is a 49-year-old male past medical history of hypertension, diabetes, anxiety presenting for abdominal pain.  Patient has generalized abdominal pain constant since yesterday nonradiating.  He notes 3 episodes of nonbloody nonbilious vomit and states he is having trouble moving his bowels.  Had a very small bowel movement today after suppository and enema.  Denies any diarrhea and states he is not passing gas.  Denies any fevers, urinary symptoms, rashes, vision changes, chest pain, shortness of breath, dizziness.  Took milk of magnesia also with no relief.        Prior to Admission Medications   Prescriptions Last Dose Informant Patient Reported? Taking?   Ozempic, 0.25 or 0.5 MG/DOSE, 2 MG/3ML injection pen Past Week Self Yes Yes   Sig: INJECT 0.5 MG UNDER THE SKIN EVERY WEEK FOR 28 DAYS   buPROPion (WELLBUTRIN) 100 mg tablet 8/24/2024 Self Yes Yes   Sig: Take 100 mg by mouth 2 (two) times a day   metFORMIN (GLUCOPHAGE) 500 mg tablet 8/24/2024 Self Yes Yes   Sig: Take 500 mg by mouth 2 (two) times a day   metFORMIN (GLUCOPHAGE-XR) 500 mg 24 hr tablet 8/24/2024 Self Yes Yes   Sig: Take 500 mg by mouth daily with breakfast   telmisartan-hydrochlorothiazide (MICARDIS HCT) 40-12.5 MG per tablet 8/24/2024 Self Yes Yes   Sig: Take 1 tablet by mouth daily   torsemide (DEMADEX) 100 mg tablet 8/24/2024 Self Yes Yes   Sig: Take 20 mg by mouth daily      Facility-Administered Medications: None       Past Medical History:   Diagnosis Date    Cancer (HCC)     skin cancer on neck    Hypertension        Past Surgical History:   Procedure Laterality Date    VENTRAL HERNIA REPAIR N/A 6/18/2023    Procedure: REPAIR HERNIA VENTRAL, laparoscopic;  Surgeon: Shay Elise MD;  Location: [facility];  Service: General       Family History    Problem Relation Age of Onset    COPD Father     Diabetes Father      I have reviewed and agree with the history as documented.    E-Cigarette/Vaping    E-Cigarette Use Never User      E-Cigarette/Vaping Substances    Nicotine No     THC No     CBD No     Flavoring No     Other No     Unknown No      Social History     Tobacco Use    Smoking status: Never    Smokeless tobacco: Never   Vaping Use    Vaping status: Never Used   Substance Use Topics    Alcohol use: Never    Drug use: Never       Review of Systems   All other systems reviewed and are negative.      Physical Exam  Physical Exam  Vitals reviewed.   Constitutional:       General: He is not in acute distress.     Appearance: Normal appearance. He is not ill-appearing.   HENT:      Mouth/Throat:      Mouth: Mucous membranes are moist.   Eyes:      Conjunctiva/sclera: Conjunctivae normal.   Cardiovascular:      Rate and Rhythm: Normal rate and regular rhythm.      Heart sounds: Normal heart sounds.   Pulmonary:      Effort: Pulmonary effort is normal.      Breath sounds: Normal breath sounds.   Abdominal:      General: Abdomen is flat.      Palpations: Abdomen is soft.      Tenderness: There is generalized abdominal tenderness. There is guarding. There is no right CVA tenderness or left CVA tenderness.      Comments: Patient has left upper and epigastric tenderness with guarding with deep palpation and does have midline nonreducible ventral hernia with no overlying skin changes   Musculoskeletal:         General: No swelling. Normal range of motion.      Cervical back: Neck supple.   Skin:     General: Skin is warm and dry.   Neurological:      General: No focal deficit present.      Mental Status: He is alert.   Psychiatric:         Mood and Affect: Mood normal.         Vital Signs  ED Triage Vitals   Temperature Pulse Respirations Blood Pressure SpO2   08/25/24 0626 08/25/24 0626 08/25/24 0626 08/25/24 0626 08/25/24 0626   97.9 °F (36.6 °C) 102 18 123/60  98 %      Temp Source Heart Rate Source Patient Position - Orthostatic VS BP Location FiO2 (%)   08/25/24 0626 08/25/24 0626 08/25/24 0626 08/25/24 0626 --   Temporal Monitor Sitting Left arm       Pain Score       08/25/24 0852       10 - Worst Possible Pain           Vitals:    08/25/24 0852 08/25/24 0900 08/25/24 0930 08/25/24 1000   BP: 138/69 143/72 140/66 142/67   Pulse: 95 92 96 97   Patient Position - Orthostatic VS: Lying Sitting  Lying         Visual Acuity      ED Medications  Medications   lactated ringers infusion (has no administration in time range)   ondansetron (ZOFRAN) injection 4 mg (has no administration in time range)   heparin (porcine) subcutaneous injection 5,000 Units (has no administration in time range)   ceFAZolin (ANCEF) IVPB (premix in dextrose) 2,000 mg 50 mL (has no administration in time range)   metroNIDAZOLE (FLAGYL) IVPB (premix) 500 mg 100 mL (has no administration in time range)   morphine injection 2 mg (2 mg Intravenous Given 8/25/24 1247)   pantoprazole (PROTONIX) injection 40 mg (has no administration in time range)   sodium chloride 0.9 % bolus 1,000 mL (0 mL Intravenous Stopped 8/25/24 0858)   HYDROmorphone (DILAUDID) injection 1 mg (1 mg Intravenous Given 8/25/24 0659)   ondansetron (ZOFRAN) injection 4 mg (4 mg Intravenous Given 8/25/24 0658)   HYDROmorphone (DILAUDID) injection 1 mg (1 mg Intravenous Given 8/25/24 0922)   ondansetron (ZOFRAN) injection 4 mg (4 mg Intravenous Given 8/25/24 0921)   HYDROmorphone (DILAUDID) injection 1 mg (1 mg Intravenous Given 8/25/24 1026)   iohexol (OMNIPAQUE) 350 MG/ML injection (SINGLE-DOSE) 100 mL (100 mL Intravenous Given 8/25/24 1110)       Diagnostic Studies  Results Reviewed       Procedure Component Value Units Date/Time    Platelet count [[id number]]     Lab Status: No result Specimen: Blood     Lactic acid, plasma (w/reflex if result > 2.0) [[id number]]  (Normal) Collected: 08/25/24 0658    Lab Status: Final result Specimen:  Blood from Arm, Left Updated: 08/25/24 0739     LACTIC ACID 1.9 mmol/L     Narrative:      Result may be elevated if tourniquet was used during collection.    Comprehensive metabolic panel [[id number]]  (Abnormal) Collected: 08/25/24 0658    Lab Status: Final result Specimen: Blood from Arm, Left Updated: 08/25/24 0739     Sodium 139 mmol/L      Potassium 4.2 mmol/L      Chloride 100 mmol/L      CO2 29 mmol/L      ANION GAP 10 mmol/L      BUN 20 mg/dL      Creatinine 1.13 mg/dL      Glucose 150 mg/dL      Calcium 9.7 mg/dL      AST 18 U/L      ALT 18 U/L      Alkaline Phosphatase 66 U/L      Total Protein 7.9 g/dL      Albumin 4.2 g/dL      Total Bilirubin 0.64 mg/dL      eGFR 74 ml/min/1.73sq m     Narrative:      [employer] guidelines for Chronic Kidney Disease (CKD):     Stage 1 with normal or high GFR (GFR > 90 mL/min/1.73 square meters)    Stage 2 Mild CKD (GFR = 60-89 mL/min/1.73 square meters)    Stage 3A Moderate CKD (GFR = 45-59 mL/min/1.73 square meters)    Stage 3B Moderate CKD (GFR = 30-44 mL/min/1.73 square meters)    Stage 4 Severe CKD (GFR = 15-29 mL/min/1.73 square meters)    Stage 5 End Stage CKD (GFR <15 mL/min/1.73 square meters)  Note: GFR calculation is accurate only with a steady state creatinine    Lipase [[id number]]  (Normal) Collected: 08/25/24 0658    Lab Status: Final result Specimen: Blood from Arm, Left Updated: 08/25/24 0739     Lipase 21 u/L     CBC and differential [[id number]]  (Abnormal) Collected: 08/25/24 0658    Lab Status: Final result Specimen: Blood from Arm, Left Updated: 08/25/24 0731     WBC 16.21 Thousand/uL      RBC 4.69 Million/uL      Hemoglobin 13.8 g/dL      Hematocrit 42.0 %      MCV 90 fL      MCH 29.4 pg      MCHC 32.9 g/dL      RDW 15.5 %      MPV 8.8 fL      Platelets 360 Thousands/uL      nRBC 0 /100 WBCs      Segmented % 90 %      Immature Grans % 0 %      Lymphocytes % 5 %      Monocytes % 5 %      Eosinophils Relative 0 %      Basophils  Relative 0 %      Absolute Neutrophils 14.44 Thousands/µL      Absolute Immature Grans 0.05 Thousand/uL      Absolute Lymphocytes 0.81 Thousands/µL      Absolute Monocytes 0.83 Thousand/µL      Eosinophils Absolute 0.05 Thousand/µL      Basophils Absolute 0.03 Thousands/µL     Narrative:      This is an appended report.  These results have been appended to a previously verified report.                   CT abdomen pelvis with contrast   Final Result by David Cox MD (08/25 1130)      Mid small bowel obstruction with transition in the right side of a moderate umbilical hernia containing incarcerated bowel and fat. Dilated small bowel distended up to 3.7 cm. No pneumatosis intestinalis.      Additional chronic findings and negatives as above.      I personally discussed this study with KIA TORRES on 8/25/2024 at 11:28                     Workstation performed: [id number]                    Procedures  ECG 12 Lead Documentation Only    Date/Time: 8/25/2024 7:20 AM    Performed by: Kia Torres DO  Authorized by: Kia Torres DO    Patient location:  ED  Previous ECG:     Previous ECG:  Compared to current    Similarity:  No change  Interpretation:     Interpretation: normal    Rate:     ECG rate assessment: normal    Rhythm:     Rhythm: sinus rhythm    Ectopy:     Ectopy: none    QRS:     QRS axis:  Left    QRS intervals:  Normal  Conduction:     Conduction: abnormal      Abnormal conduction: 1st degree    ST segments:     ST segments:  Normal  T waves:     T waves: normal             ED Course                                 SBIRT 22yo+      Flowsheet Row Most Recent Value   Initial Alcohol Screen: US AUDIT-C     1. How often do you have a drink containing alcohol? 0 Filed at: 08/25/2024 0628   2. How many drinks containing alcohol do you have on a typical day you are drinking?  0 Filed at: 08/25/2024 0628   3a. Male UNDER 65: How often do you have five or more drinks on one  occasion? 0 Filed at: 08/25/2024 0628   Audit-C Score 0 Filed at: 08/25/2024 0628   BROWN: How many times in the past year have you...    Used an illegal drug or used a prescription medication for non-medical reasons? Never Filed at: 08/25/2024 0628                      Medical Decision Making  Patient is a 49-year-old male past medical history of hypertension, diabetes, anxiety presenting for abdominal pain.  Patient is well-appearing at bedside though very uncomfortable but in no acute distress with diffuse abdominal tenderness most so in the left upper quadrant with guarding with deep palpation as well as midline nonreducible ventral hernia with no overlying skin changes and no other significant physical exam findings.  Will obtain labs to assess for electrolyte abnormalities, anemia, pancreatitis, lactic acidosis suggesting ischemia, CT abdomen pelvis to assess for small bowel obstruction, diverticulitis, appendicitis, incarcerated hernia, other intra-abdominal pathologies, give pain control and reassess.    Amount and/or Complexity of Data Reviewed  Labs: ordered.  Radiology: ordered.    Risk  Prescription drug management.  Decision regarding hospitalization.                 Disposition  Final diagnoses:   SBO (small bowel obstruction) (HCC)   Incarcerated hernia     Time reflects when diagnosis was documented in both MDM as applicable and the Disposition within this note       Time User Action Codes Description Comment    8/25/2024 11:49 AM Shay Elise Add [I10] Primary hypertension     8/25/2024 11:49 AM Shay Elise Add [E11.69] Type 2 diabetes mellitus with other specified complication, without long-term current use of insulin (HCC)     8/25/2024 11:52 AM Kia Torres Add [K56.609] SBO (small bowel obstruction) (HCC)     8/25/2024 11:52 AM Kia Torres Add [K46.0] Incarcerated hernia           ED Disposition       ED Disposition   Admit    Condition   Stable    Date/Time   Sun Aug 25,  2024 1152    Comment   Have discussed his case with Dr. Elise who recommends inpatient admission to the service of Dr. Elise.               MD Documentation      Flowsheet Row Most Recent Value   Patient Condition The patient has been stabilized such that within reasonable medical probability, no material deterioration of the patient condition or the condition of the unborn child(anna) is likely to result from the transfer   Reason for Transfer Level of Care needed not available at this facility, Other (Include comment)____________________   Benefits of Transfer Specialized equipment and/or services available at the receiving facility (Include comment)________________________, Other benefits (Include comment)_______________________   Risks of Transfer Potential for delay in receiving treatment, Other: (Include comment)__________________________   Accepting Physician Melissa   Accepting Facility Name, [facility] [place]   Sending MD Torres   Provider Certification General risk, such as traffic hazards, adverse weather conditions, rough terrain or turbulence, possible failure of equipment (including vehicle or aircraft), or consequences of actions of persons outside the control of the transport personnel, Unanticipated needs of medical equipment and personnel during transport, Risk of worsening condition, The possibility of a transport vehicle being unavailable          RN Documentation      Flowsheet Row Most Recent Value   Accepting Facility Name, [facility] [place]          Follow-up Information    None         Current Discharge Medication List        CONTINUE these medications which have NOT CHANGED    Details   buPROPion (WELLBUTRIN) 100 mg tablet Take 100 mg by mouth 2 (two) times a day      metFORMIN (GLUCOPHAGE) 500 mg tablet Take 500 mg by mouth 2 (two) times a day      metFORMIN (GLUCOPHAGE-XR) 500 mg 24 hr tablet Take 500 mg  by mouth daily with breakfast      Ozempic, 0.25 or 0.5 MG/DOSE, 2 MG/3ML injection pen INJECT 0.5 MG UNDER THE SKIN EVERY WEEK FOR 28 DAYS      telmisartan-hydrochlorothiazide (MICARDIS HCT) 40-12.5 MG per tablet Take 1 tablet by mouth daily      torsemide (DEMADEX) 100 mg tablet Take 20 mg by mouth daily             No discharge procedures on file.    PDMP Review       None            ED Provider  Electronically Signed by             Kia Torres DO  08/25/24 6200       Kia Torres,   08/25/24 0801

## 2024-08-25 NOTE — PROGRESS NOTES
Patient currently refusing current attempt for nasogastric tube insertion. Patient reports he had 2 prior failed attempts today, which caused bloody emesis output after. He reports he cannot sleep in the available bed in the room as it does not support his size and weight. He states he typically has a bariatric bed ordered for him to use while hospitalized. Dr. Elise notified. He received medications to treat nausea and abdominal pain. Patient is currently resting and is comfortable OOB to chair.

## 2024-08-26 PROBLEM — R65.10 SIRS (SYSTEMIC INFLAMMATORY RESPONSE SYNDROME) (HCC): Status: ACTIVE | Noted: 2024-08-26

## 2024-08-26 LAB
ALBUMIN SERPL BCG-MCNC: 3.7 G/DL (ref 3.5–5)
ALP SERPL-CCNC: 53 U/L (ref 34–104)
ALT SERPL W P-5'-P-CCNC: 15 U/L (ref 7–52)
ANION GAP SERPL CALCULATED.3IONS-SCNC: 5 MMOL/L (ref 4–13)
AST SERPL W P-5'-P-CCNC: 17 U/L (ref 13–39)
BASOPHILS # BLD AUTO: 0.02 THOUSANDS/ÂΜL (ref 0–0.1)
BASOPHILS NFR BLD AUTO: 0 % (ref 0–1)
BILIRUB SERPL-MCNC: 0.64 MG/DL (ref 0.2–1)
BUN SERPL-MCNC: 17 MG/DL (ref 5–25)
CALCIUM SERPL-MCNC: 8.9 MG/DL (ref 8.4–10.2)
CHLORIDE SERPL-SCNC: 103 MMOL/L (ref 96–108)
CO2 SERPL-SCNC: 31 MMOL/L (ref 21–32)
CREAT SERPL-MCNC: 0.92 MG/DL (ref 0.6–1.3)
EOSINOPHIL # BLD AUTO: 0.15 THOUSAND/ÂΜL (ref 0–0.61)
EOSINOPHIL NFR BLD AUTO: 2 % (ref 0–6)
ERYTHROCYTE [DISTWIDTH] IN BLOOD BY AUTOMATED COUNT: 15.6 % (ref 11.6–15.1)
EST. AVERAGE GLUCOSE BLD GHB EST-MCNC: 123 MG/DL
GFR SERPL CREATININE-BSD FRML MDRD: 95 ML/MIN/1.73SQ M
GLUCOSE SERPL-MCNC: 109 MG/DL (ref 65–140)
GLUCOSE SERPL-MCNC: 93 MG/DL (ref 65–140)
GLUCOSE SERPL-MCNC: 98 MG/DL (ref 65–140)
HBA1C MFR BLD: 5.9 %
HCT VFR BLD AUTO: 40.4 % (ref 36.5–49.3)
HGB BLD-MCNC: 13.1 G/DL (ref 12–17)
IMM GRANULOCYTES # BLD AUTO: 0.01 THOUSAND/UL (ref 0–0.2)
IMM GRANULOCYTES NFR BLD AUTO: 0 % (ref 0–2)
LYMPHOCYTES # BLD AUTO: 1.15 THOUSANDS/ÂΜL (ref 0.6–4.47)
LYMPHOCYTES NFR BLD AUTO: 15 % (ref 14–44)
MAGNESIUM SERPL-MCNC: 2.3 MG/DL (ref 1.9–2.7)
MCH RBC QN AUTO: 29.5 PG (ref 26.8–34.3)
MCHC RBC AUTO-ENTMCNC: 32.4 G/DL (ref 31.4–37.4)
MCV RBC AUTO: 91 FL (ref 82–98)
MONOCYTES # BLD AUTO: 1.2 THOUSAND/ÂΜL (ref 0.17–1.22)
MONOCYTES NFR BLD AUTO: 15 % (ref 4–12)
NEUTROPHILS # BLD AUTO: 5.27 THOUSANDS/ÂΜL (ref 1.85–7.62)
NEUTS SEG NFR BLD AUTO: 68 % (ref 43–75)
NRBC BLD AUTO-RTO: 0 /100 WBCS
PHOSPHATE SERPL-MCNC: 2.9 MG/DL (ref 2.7–4.5)
PLATELET # BLD AUTO: 316 THOUSANDS/UL (ref 149–390)
PMV BLD AUTO: 8.9 FL (ref 8.9–12.7)
POTASSIUM SERPL-SCNC: 4.1 MMOL/L (ref 3.5–5.3)
PROT SERPL-MCNC: 7.1 G/DL (ref 6.4–8.4)
RBC # BLD AUTO: 4.44 MILLION/UL (ref 3.88–5.62)
SODIUM SERPL-SCNC: 139 MMOL/L (ref 135–147)
WBC # BLD AUTO: 7.8 THOUSAND/UL (ref 4.31–10.16)

## 2024-08-26 PROCEDURE — 83036 HEMOGLOBIN GLYCOSYLATED A1C: CPT | Performed by: FAMILY MEDICINE

## 2024-08-26 PROCEDURE — 99232 SBSQ HOSP IP/OBS MODERATE 35: CPT | Performed by: CLINICAL NURSE SPECIALIST

## 2024-08-26 PROCEDURE — 80053 COMPREHEN METABOLIC PANEL: CPT | Performed by: SURGERY

## 2024-08-26 PROCEDURE — 99255 IP/OBS CONSLTJ NEW/EST HI 80: CPT | Performed by: FAMILY MEDICINE

## 2024-08-26 PROCEDURE — 82948 REAGENT STRIP/BLOOD GLUCOSE: CPT

## 2024-08-26 PROCEDURE — 85025 COMPLETE CBC W/AUTO DIFF WBC: CPT | Performed by: SURGERY

## 2024-08-26 PROCEDURE — 83735 ASSAY OF MAGNESIUM: CPT | Performed by: SURGERY

## 2024-08-26 PROCEDURE — 84100 ASSAY OF PHOSPHORUS: CPT | Performed by: SURGERY

## 2024-08-26 RX ORDER — KETOROLAC TROMETHAMINE 30 MG/ML
15 INJECTION, SOLUTION INTRAMUSCULAR; INTRAVENOUS ONCE
Status: COMPLETED | OUTPATIENT
Start: 2024-08-26 | End: 2024-08-26

## 2024-08-26 RX ADMIN — MORPHINE SULFATE 2 MG: 2 INJECTION, SOLUTION INTRAMUSCULAR; INTRAVENOUS at 11:47

## 2024-08-26 RX ADMIN — MORPHINE SULFATE 2 MG: 2 INJECTION, SOLUTION INTRAMUSCULAR; INTRAVENOUS at 21:50

## 2024-08-26 RX ADMIN — MORPHINE SULFATE 2 MG: 2 INJECTION, SOLUTION INTRAMUSCULAR; INTRAVENOUS at 05:46

## 2024-08-26 RX ADMIN — MORPHINE SULFATE 2 MG: 2 INJECTION, SOLUTION INTRAMUSCULAR; INTRAVENOUS at 17:32

## 2024-08-26 RX ADMIN — METRONIDAZOLE 500 MG: 500 INJECTION, SOLUTION INTRAVENOUS at 19:41

## 2024-08-26 RX ADMIN — METRONIDAZOLE 500 MG: 500 INJECTION, SOLUTION INTRAVENOUS at 04:45

## 2024-08-26 RX ADMIN — SODIUM CHLORIDE, SODIUM LACTATE, POTASSIUM CHLORIDE, AND CALCIUM CHLORIDE 125 ML/HR: .6; .31; .03; .02 INJECTION, SOLUTION INTRAVENOUS at 11:19

## 2024-08-26 RX ADMIN — HEPARIN SODIUM 5000 UNITS: 5000 INJECTION INTRAVENOUS; SUBCUTANEOUS at 21:35

## 2024-08-26 RX ADMIN — KETOROLAC TROMETHAMINE 15 MG: 30 INJECTION, SOLUTION INTRAMUSCULAR; INTRAVENOUS at 20:38

## 2024-08-26 RX ADMIN — ONDANSETRON 4 MG: 2 INJECTION INTRAMUSCULAR; INTRAVENOUS at 05:46

## 2024-08-26 RX ADMIN — MORPHINE SULFATE 2 MG: 2 INJECTION, SOLUTION INTRAMUSCULAR; INTRAVENOUS at 19:49

## 2024-08-26 RX ADMIN — MORPHINE SULFATE 2 MG: 2 INJECTION, SOLUTION INTRAMUSCULAR; INTRAVENOUS at 13:45

## 2024-08-26 RX ADMIN — HEPARIN SODIUM 5000 UNITS: 5000 INJECTION INTRAVENOUS; SUBCUTANEOUS at 13:45

## 2024-08-26 RX ADMIN — MORPHINE SULFATE 2 MG: 2 INJECTION, SOLUTION INTRAMUSCULAR; INTRAVENOUS at 07:50

## 2024-08-26 RX ADMIN — SODIUM CHLORIDE, SODIUM LACTATE, POTASSIUM CHLORIDE, AND CALCIUM CHLORIDE 125 ML/HR: .6; .31; .03; .02 INJECTION, SOLUTION INTRAVENOUS at 01:58

## 2024-08-26 RX ADMIN — ONDANSETRON 4 MG: 2 INJECTION INTRAMUSCULAR; INTRAVENOUS at 19:48

## 2024-08-26 RX ADMIN — PANTOPRAZOLE SODIUM 40 MG: 40 INJECTION, POWDER, FOR SOLUTION INTRAVENOUS at 08:03

## 2024-08-26 RX ADMIN — ONDANSETRON 4 MG: 2 INJECTION INTRAMUSCULAR; INTRAVENOUS at 12:11

## 2024-08-26 RX ADMIN — MORPHINE SULFATE 2 MG: 2 INJECTION, SOLUTION INTRAMUSCULAR; INTRAVENOUS at 09:47

## 2024-08-26 RX ADMIN — OXYMETAZOLINE HYDROCHLORIDE 2 SPRAY: 0.5 SPRAY NASAL at 21:35

## 2024-08-26 RX ADMIN — METRONIDAZOLE 500 MG: 500 INJECTION, SOLUTION INTRAVENOUS at 11:27

## 2024-08-26 NOTE — ASSESSMENT & PLAN NOTE
"No results found for: \"HGBA1C\"    No results for input(s): \"POCGLU\" in the last 72 hours.    Blood Sugar Average: Last 72 hrs:    On metformin at home, hold   Will start SSI  Check A1C level  Currently NPO  "

## 2024-08-26 NOTE — UTILIZATION REVIEW
Initial Clinical Review    Admission: Date/Time/Statement:   Admission Orders (From admission, onward)       Ordered        08/25/24 1150  Inpatient Admission  Once                          Orders Placed This Encounter   Procedures    Inpatient Admission     Standing Status:   Standing     Number of Occurrences:   1     Order Specific Question:   Level of Care     Answer:   Med Surg [16]     Order Specific Question:   Estimated length of stay     Answer:   More than 2 Midnights     Order Specific Question:   Certification     Answer:   I certify that inpatient services are medically necessary for this patient for a duration of greater than two midnights. See H&P and MD Progress Notes for additional information about the patient's course of treatment.     ED Arrival Information       Expected   -    Arrival   8/25/2024 06:22    Acuity   Urgent              Means of arrival   Walk-In    Escorted by   Family Member    Service   Surgery-General    Admission type   Emergency              Arrival complaint   constipation/vomiting             Chief Complaint   Patient presents with    Abdominal Pain     Pt arrives ambulatory with a c/o ABD pain and N/V since yesterday morning as well as constipation. LBM 1.5 hours ago.       Initial Presentation: 49 y.o. male to ED from home w/ c/o crampy abdominal pain and several episodes of nausea and vomiting since this morning. Patient had a history of incarcerated umbilical hernia in 2023 for which he underwent laparoscopic repair with sutures due to the presence of colon within the hernia sac. Patient noted a lump above the umbilicus a month later after surgery. Admitted IP status w/ incarcerated ventral hernia . Plan for NPO , IVF , IV abx , NG tube for decompression .     Date:  8/26  Day 2: no acute events . Denies bowel function , does report some flatus in afternoon . Abd pain improved. Cont NG tub fro decompression . Cont conservative mngt , NPO , IVF , NGT , Bowel rest . Adv  diet with return ghada bowel function , serial abd exams . Monitor vitals and labs .     8/26 IM Consult   Complains of generalized abdominal pain and discomfort, 9/10, worsens with movement. Unable to tolerate NG tube . Cont Ivf . SIRS IV flagyl . Leukocytosis improved . HTN NPO , labetalol prn .     ED Triage Vitals   Temperature Pulse Respirations Blood Pressure SpO2 Pain Score   08/25/24 0626 08/25/24 0626 08/25/24 0626 08/25/24 0626 08/25/24 0626 08/25/24 0852   97.9 °F (36.6 °C) 102 18 123/60 98 % 10 - Worst Possible Pain     Weight (last 2 days)       None            Vital Signs (last 3 days)       Date/Time Temp Pulse Resp BP MAP (mmHg) SpO2 O2 Device Patient Position - Orthostatic VS Pain    08/26/24 0750 97.8 °F (36.6 °C) -- 18 126/62 83 95 % None (Room air) Lying 10 - Worst Possible Pain    08/26/24 0746 -- -- -- -- -- -- -- -- 10 - Worst Possible Pain    08/26/24 0546 -- -- -- -- -- -- None (Room air) -- 9    08/25/24 2347 -- -- -- -- -- -- -- -- 9 08/25/24 23:36:37 98.3 °F (36.8 °C) 105 -- 101/62 75 93 % -- -- --    08/25/24 23:27:48 -- 97 18 101/62 75 92 % -- -- --    08/25/24 2140 -- -- -- -- -- -- -- -- 8 08/25/24 1906 -- -- -- -- -- -- -- -- 8    08/25/24 1706 -- -- -- -- -- -- -- -- 7    08/25/24 15:02:39 98.5 °F (36.9 °C) 101 16 91/51 64 96 % -- -- --    08/25/24 1457 -- -- -- -- -- -- -- -- 6    08/25/24 1411 -- -- -- -- -- -- -- -- 6    08/25/24 13:51:41 -- 108 16 108/66 80 94 % -- -- --    08/25/24 1008 -- -- -- -- -- -- -- -- 10 - Worst Possible Pain    08/25/24 1000 -- 97 16 142/67 97 91 % None (Room air) Lying --    08/25/24 0930 -- 96 17 140/66 94 95 % None (Room air) -- --    08/25/24 0900 -- 92 15 143/72 100 96 % None (Room air) Sitting --    08/25/24 0852 -- 95 12 138/69 97 96 % None (Room air) Lying 10 - Worst Possible Pain    08/25/24 0626 97.9 °F (36.6 °C) 102 18 123/60 85 98 % None (Room air) Sitting --              Pertinent Labs/Diagnostic Test Results:   Radiology:  8/25 EKG  NSR   CT abdomen pelvis with contrast   Final Interpretation by David Cox MD (08/25 1130)      Mid small bowel obstruction with transition in the right side of a moderate umbilical hernia containing incarcerated bowel and fat. Dilated small bowel distended up to 3.7 cm. No pneumatosis intestinalis.      Additional chronic findings and negatives as above.      I personally discussed this study with NAHOMY CORTES on 8/25/2024 at 11:28                     Workstation performed: [id number]           Cardiology:  No orders to display     GI:  No orders to display           Results from last 7 days   Lab Units 08/26/24  0531 08/25/24  0658   WBC Thousand/uL 7.80 16.21*   HEMOGLOBIN g/dL 13.1 13.8   HEMATOCRIT % 40.4 42.0   PLATELETS Thousands/uL 316 360   TOTAL NEUT ABS Thousands/µL 5.27 14.44*         Results from last 7 days   Lab Units 08/26/24  0531 08/25/24  0658   SODIUM mmol/L 139 139   POTASSIUM mmol/L 4.1 4.2   CHLORIDE mmol/L 103 100   CO2 mmol/L 31 29   ANION GAP mmol/L 5 10   BUN mg/dL 17 20   CREATININE mg/dL 0.92 1.13   EGFR ml/min/1.73sq m 95 74   CALCIUM mg/dL 8.9 9.7   MAGNESIUM mg/dL 2.3  --    PHOSPHORUS mg/dL 2.9  --      Results from last 7 days   Lab Units 08/26/24  0531 08/25/24  0658   AST U/L 17 18   ALT U/L 15 18   ALK PHOS U/L 53 66   TOTAL PROTEIN g/dL 7.1 7.9   ALBUMIN g/dL 3.7 4.2   TOTAL BILIRUBIN mg/dL 0.64 0.64     Results from last 7 days   Lab Units 08/26/24  0531 08/25/24  0658   GLUCOSE RANDOM mg/dL 109 150*       Results from last 7 days   Lab Units 08/25/24  0658   LACTIC ACID mmol/L 1.9       Results from last 7 days   Lab Units 08/25/24  0658   LIPASE u/L 21         ED Treatment-Medication Administration from 08/25/2024 0622 to 08/25/2024 1327         Date/Time Order Dose Route Action     08/25/2024 0659 sodium chloride 0.9 % bolus 1,000 mL 1,000 mL Intravenous New Bag     08/25/2024 0659 HYDROmorphone (DILAUDID) injection 1 mg 1 mg Intravenous Given      08/25/2024 0658 ondansetron (ZOFRAN) injection 4 mg 4 mg Intravenous Given     08/25/2024 0922 HYDROmorphone (DILAUDID) injection 1 mg 1 mg Intravenous Given     08/25/2024 0921 ondansetron (ZOFRAN) injection 4 mg 4 mg Intravenous Given     08/25/2024 1026 HYDROmorphone (DILAUDID) injection 1 mg 1 mg Intravenous Given     08/25/2024 1110 iohexol (OMNIPAQUE) 350 MG/ML injection (SINGLE-DOSE) 100 mL 100 mL Intravenous Given     08/25/2024 1247 morphine injection 2 mg 2 mg Intravenous Given            Past Medical History:   Diagnosis Date    Cancer ([facility]ly [facility])     skin cancer on neck    Hypertension      Present on Admission:  **None**      Admitting Diagnosis: Primary hypertension [I10]  SBO (small bowel obstruction) ([facility]) [K56.609]  Abdominal pain [R10.9]  Incarcerated hernia [K46.0]  Type 2 diabetes mellitus with other specified complication, without long-term current use of insulin ([facility]) [E11.69]  Age/Sex: 49 y.o. male  Admission Orders:  Scheduled Medications:  heparin (porcine), 5,000 Units, Subcutaneous, Q8H TAI  metroNIDAZOLE, 500 mg, Intravenous, Q8H  oxymetazoline, 2 spray, Each Nare, HS  pantoprazole, 40 mg, Intravenous, Q24H TAI      Continuous IV Infusions:  lactated ringers, 125 mL/hr, Intravenous, Continuous      PRN Meds:  morphine injection, 2 mg, Intravenous, Q2H PRN  8/25 x6 8/26 x5  ondansetron, 4 mg, Intravenous, Q6H PRN  8/25 x2 8/26 x2    NPO   NG LIS   Tele   fingerstick q6hr       IP CONSULT TO INTERNAL MEDICINE    Network Utilization Review Department  ATTENTION: Please call with any questions or concerns to [phone] and carefully listen to the prompts so that you are directed to the right person. All voicemails are confidential.   For Discharge needs, contact Care Management DC Support Team at [phone] opt. 2  Send all requests for admission clinical reviews, approved or denied determinations and any other requests to dedicated fax number below belonging to the campus where the patient is  receiving treatment. List of dedicated fax numbers for the Facilities:  FACILITY NAME UR FAX NUMBER   ADMISSION DENIALS (Administrative/Medical Necessity) [phone]   DISCHARGE SUPPORT TEAM (NETWORK) [phone]   PARENT CHILD HEALTH (Maternity/NICU/Pediatrics) [phone]   [facility] [phone]   [facility] [phone]   [facility] [phone]   [facility] [phone]   [facility] [phone]   [facility] [phone]   [facility] [phone]   [facility] [phone]   [facility] [phone]   [facility] [phone]   [facility] [phone]   [facility] [phone]

## 2024-08-26 NOTE — UTILIZATION REVIEW
Initial Clinical Review    Admission: Date/Time/Statement:   Admission Orders (From admission, onward)       Ordered        08/25/24 1150  Inpatient Admission  Once                          Orders Placed This Encounter   Procedures    Inpatient Admission     Standing Status:   Standing     Number of Occurrences:   1     Order Specific Question:   Level of Care     Answer:   Med Surg [16]     Order Specific Question:   Estimated length of stay     Answer:   More than 2 Midnights     Order Specific Question:   Certification     Answer:   I certify that inpatient services are medically necessary for this patient for a duration of greater than two midnights. See H&P and MD Progress Notes for additional information about the patient's course of treatment.     ED Arrival Information       Expected   -    Arrival   8/25/2024 06:22    Acuity   Urgent              Means of arrival   Walk-In    Escorted by   Family Member    Service   Surgery-General    Admission type   Emergency              Arrival complaint   constipation/vomiting             Chief Complaint   Patient presents with    Abdominal Pain     Pt arrives ambulatory with a c/o ABD pain and N/V since yesterday morning as well as constipation. LBM 1.5 hours ago.       Initial Presentation: 49 y.o. male     Anticipated Length of Stay/Certification Statement:     Date:    Day 2:     ED Triage Vitals   Temperature Pulse Respirations Blood Pressure SpO2 Pain Score   08/25/24 0626 08/25/24 0626 08/25/24 0626 08/25/24 0626 08/25/24 0626 08/25/24 0852   97.9 °F (36.6 °C) 102 18 123/60 98 % 10 - Worst Possible Pain     Weight (last 2 days)       None            Vital Signs (last 3 days)       Date/Time Temp Pulse Resp BP MAP (mmHg) SpO2 O2 Device Patient Position - Orthostatic VS Pain    08/26/24 0750 97.8 °F (36.6 °C) -- 18 126/62 83 95 % None (Room air) Lying 10 - Worst Possible Pain    08/26/24 0746 -- -- -- -- -- -- -- -- 10 - Worst Possible Pain    08/26/24 0546 -- --  -- -- -- -- None (Room air) -- 9    08/25/24 2347 -- -- -- -- -- -- -- -- 9    08/25/24 23:36:37 98.3 °F (36.8 °C) 105 -- 101/62 75 93 % -- -- --    08/25/24 23:27:48 -- 97 18 101/62 75 92 % -- -- --    08/25/24 2140 -- -- -- -- -- -- -- -- 8    08/25/24 1906 -- -- -- -- -- -- -- -- 8    08/25/24 1706 -- -- -- -- -- -- -- -- 7    08/25/24 15:02:39 98.5 °F (36.9 °C) 101 16 91/51 64 96 % -- -- --    08/25/24 1457 -- -- -- -- -- -- -- -- 6    08/25/24 1411 -- -- -- -- -- -- -- -- 6    08/25/24 13:51:41 -- 108 16 108/66 80 94 % -- -- --    08/25/24 1008 -- -- -- -- -- -- -- -- 10 - Worst Possible Pain    08/25/24 1000 -- 97 16 142/67 97 91 % None (Room air) Lying --    08/25/24 0930 -- 96 17 140/66 94 95 % None (Room air) -- --    08/25/24 0900 -- 92 15 143/72 100 96 % None (Room air) Sitting --    08/25/24 0852 -- 95 12 138/69 97 96 % None (Room air) Lying 10 - Worst Possible Pain    08/25/24 0626 97.9 °F (36.6 °C) 102 18 123/60 85 98 % None (Room air) Sitting --              Pertinent Labs/Diagnostic Test Results:   Radiology:  CT abdomen pelvis with contrast   Final Interpretation by David Cox MD (08/25 1130)      Mid small bowel obstruction with transition in the right side of a moderate umbilical hernia containing incarcerated bowel and fat. Dilated small bowel distended up to 3.7 cm. No pneumatosis intestinalis.      Additional chronic findings and negatives as above.      I personally discussed this study with NAHOMY CORTES on 8/25/2024 at 11:28                     Workstation performed: [id number]           Cardiology:  No orders to display     GI:  No orders to display           Results from last 7 days   Lab Units 08/26/24  0531 08/25/24  0658   WBC Thousand/uL 7.80 16.21*   HEMOGLOBIN g/dL 13.1 13.8   HEMATOCRIT % 40.4 42.0   PLATELETS Thousands/uL 316 360   TOTAL NEUT ABS Thousands/µL 5.27 14.44*         Results from last 7 days   Lab Units 08/26/24  0531 08/25/24  0658   SODIUM  "mmol/L 139 139   POTASSIUM mmol/L 4.1 4.2   CHLORIDE mmol/L 103 100   CO2 mmol/L 31 29   ANION GAP mmol/L 5 10   BUN mg/dL 17 20   CREATININE mg/dL 0.92 1.13   EGFR ml/min/1.73sq m 95 74   CALCIUM mg/dL 8.9 9.7   MAGNESIUM mg/dL 2.3  --    PHOSPHORUS mg/dL 2.9  --      Results from last 7 days   Lab Units 08/26/24  0531 08/25/24  0658   AST U/L 17 18   ALT U/L 15 18   ALK PHOS U/L 53 66   TOTAL PROTEIN g/dL 7.1 7.9   ALBUMIN g/dL 3.7 4.2   TOTAL BILIRUBIN mg/dL 0.64 0.64         Results from last 7 days   Lab Units 08/26/24  0531 08/25/24  0658   GLUCOSE RANDOM mg/dL 109 150*             No results found for: \"BETA-HYDROXYBUTYRATE\"                                       Results from last 7 days   Lab Units 08/25/24  0658   LACTIC ACID mmol/L 1.9                                 Results from last 7 days   Lab Units 08/25/24  0658   LIPASE u/L 21                                                                   ED Treatment-Medication Administration from 08/25/2024 0622 to 08/25/2024 1327         Date/Time Order Dose Route Action     08/25/2024 0659 sodium chloride 0.9 % bolus 1,000 mL 1,000 mL Intravenous New Bag     08/25/2024 0659 HYDROmorphone (DILAUDID) injection 1 mg 1 mg Intravenous Given     08/25/2024 0658 ondansetron (ZOFRAN) injection 4 mg 4 mg Intravenous Given     08/25/2024 0922 HYDROmorphone (DILAUDID) injection 1 mg 1 mg Intravenous Given     08/25/2024 0921 ondansetron (ZOFRAN) injection 4 mg 4 mg Intravenous Given     08/25/2024 1026 HYDROmorphone (DILAUDID) injection 1 mg 1 mg Intravenous Given     08/25/2024 1110 iohexol (OMNIPAQUE) 350 MG/ML injection (SINGLE-DOSE) 100 mL 100 mL Intravenous Given     08/25/2024 1247 morphine injection 2 mg 2 mg Intravenous Given            Past Medical History:   Diagnosis Date    Cancer (HCC)     skin cancer on neck    Hypertension      Present on Admission:  **None**      Admitting Diagnosis: Primary hypertension [I10]  SBO (small bowel obstruction) (HCC) " [K56.609]  Abdominal pain [R10.9]  Incarcerated hernia [K46.0]  Type 2 diabetes mellitus with other specified complication, without long-term current use of insulin (HCC) [E11.69]  Age/Sex: 49 y.o. male  Admission Orders:  Scheduled Medications:  heparin (porcine), 5,000 Units, Subcutaneous, Q8H TAI  metroNIDAZOLE, 500 mg, Intravenous, Q8H  oxymetazoline, 2 spray, Each Nare, HS  pantoprazole, 40 mg, Intravenous, Q24H TAI      Continuous IV Infusions:  lactated ringers, 125 mL/hr, Intravenous, Continuous      PRN Meds:  morphine injection, 2 mg, Intravenous, Q2H PRN  ondansetron, 4 mg, Intravenous, Q6H PRN        IP CONSULT TO INTERNAL MEDICINE    Network Utilization Review Department  ATTENTION: Please call with any questions or concerns to [phone] and carefully listen to the prompts so that you are directed to the right person. All voicemails are confidential.   For Discharge needs, contact Care Management DC Support Team at [phone] opt. 2  Send all requests for admission clinical reviews, approved or denied determinations and any other requests to dedicated fax number below belonging to the campus where the patient is receiving treatment. List of dedicated fax numbers for the Facilities:  FACILITY NAME UR FAX NUMBER   ADMISSION DENIALS (Administrative/Medical Necessity) [phone]   DISCHARGE SUPPORT TEAM (NETWORK) [phone]   PARENT CHILD HEALTH (Maternity/NICU/Pediatrics) [phone]   [facility] [phone]   [facility] [phone]   [facility] [phone]   [facility] [phone]   [facility] [phone]   [facility] [phone]   [facility] [phone]   [facility] [phone]   [facility] [phone]   [facility]  [place] [phone]   [facility] [phone]   [facility] [phone]

## 2024-08-26 NOTE — PLAN OF CARE
Problem: PAIN - ADULT  Goal: Verbalizes/displays adequate comfort level or baseline comfort level  Description: Interventions:  - Encourage patient to monitor pain and request assistance  - Assess pain using appropriate pain scale  - Administer analgesics based on type and severity of pain and evaluate response  - Implement non-pharmacological measures as appropriate and evaluate response  - Consider cultural and social influences on pain and pain management  - Notify physician/advanced practitioner if interventions unsuccessful or patient reports new pain  Outcome: Progressing     Problem: INFECTION - ADULT  Goal: Absence or prevention of progression during hospitalization  Description: INTERVENTIONS:  - Assess and monitor for signs and symptoms of infection  - Monitor lab/diagnostic results  - Monitor all insertion sites, i.e. indwelling lines, tubes, and drains  - Monitor endotracheal if appropriate and nasal secretions for changes in amount and color  - [place] appropriate cooling/warming therapies per order  - Administer medications as ordered  - Instruct and encourage patient and family to use good hand hygiene technique  - Identify and instruct in appropriate isolation precautions for identified infection/condition  Outcome: Progressing  Goal: Absence of fever/infection during neutropenic period  Description: INTERVENTIONS:  - Monitor WBC    Outcome: Progressing     Problem: SAFETY ADULT  Goal: Patient will remain free of falls  Description: INTERVENTIONS:  - Educate patient/family on patient safety including physical limitations  - Instruct patient to call for assistance with activity   - Consult OT/PT to assist with strengthening/mobility   - Keep Call bell within reach  - Keep bed low and locked with side rails adjusted as appropriate  - Keep care items and personal belongings within reach  - Initiate and maintain comfort rounds  - Make Fall Risk Sign visible to staff  - Offer Toileting every 2 Hours,  in advance of need  - Initiate/Maintain bed alarm  - Obtain necessary fall risk management equipment  - Apply yellow socks and bracelet for high fall risk patients  - Consider moving patient to room near nurses station  Outcome: Progressing  Goal: Maintain or return to baseline ADL function  Description: INTERVENTIONS:  -  Assess patient's ability to carry out ADLs; assess patient's baseline for ADL function and identify physical deficits which impact ability to perform ADLs (bathing, care of mouth/teeth, toileting, grooming, dressing, etc.)  - Assess/evaluate cause of self-care deficits   - Assess range of motion  - Assess patient's mobility; develop plan if impaired  - Assess patient's need for assistive devices and provide as appropriate  - Encourage maximum independence but intervene and supervise when necessary  - Involve family in performance of ADLs  - Assess for home care needs following discharge   - Consider OT consult to assist with ADL evaluation and planning for discharge  - Provide patient education as appropriate  Outcome: Progressing  Goal: Maintains/Returns to pre admission functional level  Description: INTERVENTIONS:  - Perform AM-PAC 6 Click Basic Mobility/ Daily Activity assessment daily.  - Set and communicate daily mobility goal to care team and patient/family/caregiver.   - Collaborate with rehabilitation services on mobility goals if consulted  - Perform Range of Motion 4 times a day.  - Reposition patient every 2 hours.  - Dangle patient 4 times a day  - Stand patient 4 times a day  - Ambulate patient 4 times a day  - Out of bed to chair 4 times a day   - Out of bed for meals 4 times a day  - Out of bed for toileting  - Record patient progress and toleration of activity level   Outcome: Progressing     Problem: DISCHARGE PLANNING  Goal: Discharge to home or other facility with appropriate resources  Description: INTERVENTIONS:  - Identify barriers to discharge w/patient and caregiver  -  Arrange for needed discharge resources and transportation as appropriate  - Identify discharge learning needs (meds, wound care, etc.)  - Arrange for interpretive services to assist at discharge as needed  - Refer to Case Management Department for coordinating discharge planning if the patient needs post-hospital services based on physician/advanced practitioner order or complex needs related to functional status, cognitive ability, or social support system  Outcome: Progressing     Problem: Knowledge Deficit  Goal: Patient/family/caregiver demonstrates understanding of disease process, treatment plan, medications, and discharge instructions  Description: Complete learning assessment and assess knowledge base.  Interventions:  - Provide teaching at level of understanding  - Provide teaching via preferred learning methods  Outcome: Progressing     Problem: GASTROINTESTINAL - ADULT  Goal: Minimal or absence of nausea and/or vomiting  Description: INTERVENTIONS:  - Administer IV fluids if ordered to ensure adequate hydration  - Maintain NPO status until nausea and vomiting are resolved  - Nasogastric tube if ordered  - Administer ordered antiemetic medications as needed  - Provide nonpharmacologic comfort measures as appropriate  - Advance diet as tolerated, if ordered  - Consider nutrition services referral to assist patient with adequate nutrition and appropriate food choices  Outcome: Progressing  Goal: Maintains or returns to baseline bowel function  Description: INTERVENTIONS:  - Assess bowel function  - Encourage oral fluids to ensure adequate hydration  - Administer IV fluids if ordered to ensure adequate hydration  - Administer ordered medications as needed  - Encourage mobilization and activity  - Consider nutritional services referral to assist patient with adequate nutrition and appropriate food choices  Outcome: Progressing  Goal: Maintains adequate nutritional intake  Description: INTERVENTIONS:  - Monitor  percentage of each meal consumed  - Identify factors contributing to decreased intake, treat as appropriate  - Assist with meals as needed  - Monitor I&O, weight, and lab values if indicated  - Obtain nutrition services referral as needed  Outcome: Progressing     Problem: METABOLIC, FLUID AND ELECTROLYTES - ADULT  Goal: Electrolytes maintained within normal limits  Description: INTERVENTIONS:  - Monitor labs and assess patient for signs and symptoms of electrolyte imbalances  - Administer electrolyte replacement as ordered  - Monitor response to electrolyte replacements, including repeat lab results as appropriate  - Instruct patient on fluid and nutrition as appropriate  Outcome: Progressing  Goal: Fluid balance maintained  Description: INTERVENTIONS:  - Monitor labs   - Monitor I/O and WT  - Instruct patient on fluid and nutrition as appropriate  - Assess for signs & symptoms of volume excess or deficit  Outcome: Progressing     Problem: SKIN/TISSUE INTEGRITY - ADULT  Goal: Skin Integrity remains intact(Skin Breakdown Prevention)  Description: Assess:  -Perform Gilbert assessment every shift  -Clean and moisturize skin every day and as needed  -Inspect skin when repositioning, toileting, and assisting with ADLS  -Assess under medical devices  -Assess extremities for adequate circulation and sensation     Bed Management:  -Have minimal linens on bed & keep smooth, unwrinkled  -Change linens as needed when moist or perspiring  -Avoid sitting or lying in one position for more than 2 hours while in bed    Toileting:  -Offer bedside commode  -Assess for incontinence every 2 hours  -Use incontinent care products after each incontinent episode    Activity:  -Mobilize patient 4 times a day  -Encourage activity and walks on unit  -Encourage or provide ROM exercises   -Turn and reposition patient every 2 Hours  -Use appropriate equipment to lift or move patient in bed  -Instruct/ Assist with weight shifting every 30 minutes  when out of bed in chair  -Consider limitation of chair time 2 hour intervals    Skin Care:  -Avoid use of baby powder, tape, friction and shearing, hot water or constrictive clothing  -Relieve pressure over bony prominences  -Do not massage red bony areas    Next Steps:  -Teach patient strategies to minimize risks   -Consider consults to  interdisciplinary teams  Outcome: Progressing     Problem: HEMATOLOGIC - ADULT  Goal: Maintains hematologic stability  Description: INTERVENTIONS  - Assess for signs and symptoms of bleeding or hemorrhage  - Monitor labs  - Administer supportive blood products/factors as ordered and appropriate  Outcome: Progressing

## 2024-08-26 NOTE — PLAN OF CARE
Problem: PAIN - ADULT  Goal: Verbalizes/displays adequate comfort level or baseline comfort level  Description: Interventions:  - Encourage patient to monitor pain and request assistance  - Assess pain using appropriate pain scale  - Administer analgesics based on type and severity of pain and evaluate response  - Implement non-pharmacological measures as appropriate and evaluate response  - Consider cultural and social influences on pain and pain management  - Notify physician/advanced practitioner if interventions unsuccessful or patient reports new pain  Outcome: Progressing     Problem: INFECTION - ADULT  Goal: Absence or prevention of progression during hospitalization  Description: INTERVENTIONS:  - Assess and monitor for signs and symptoms of infection  - Monitor lab/diagnostic results  - Monitor all insertion sites, i.e. indwelling lines, tubes, and drains  - Monitor endotracheal if appropriate and nasal secretions for changes in amount and color  - [place] appropriate cooling/warming therapies per order  - Administer medications as ordered  - Instruct and encourage patient and family to use good hand hygiene technique  - Identify and instruct in appropriate isolation precautions for identified infection/condition  Outcome: Progressing  Goal: Absence of fever/infection during neutropenic period  Description: INTERVENTIONS:  - Monitor WBC    Outcome: Progressing     Problem: SAFETY ADULT  Goal: Patient will remain free of falls  Description: INTERVENTIONS:  - Educate patient/family on patient safety including physical limitations  - Instruct patient to call for assistance with activity   - Consult OT/PT to assist with strengthening/mobility   - Keep Call bell within reach  - Keep bed low and locked with side rails adjusted as appropriate  - Keep care items and personal belongings within reach  - Initiate and maintain comfort rounds  - Make Fall Risk Sign visible to staff  - Initiate/Maintain bed alarm  -  Apply yellow socks and bracelet for high fall risk patients  - Consider moving patient to room near nurses station  Outcome: Progressing  Goal: Maintain or return to baseline ADL function  Description: INTERVENTIONS:  -  Assess patient's ability to carry out ADLs; assess patient's baseline for ADL function and identify physical deficits which impact ability to perform ADLs (bathing, care of mouth/teeth, toileting, grooming, dressing, etc.)  - Assess/evaluate cause of self-care deficits   - Assess range of motion  - Assess patient's mobility; develop plan if impaired  - Assess patient's need for assistive devices and provide as appropriate  - Encourage maximum independence but intervene and supervise when necessary  - Involve family in performance of ADLs  - Assess for home care needs following discharge   - Consider OT consult to assist with ADL evaluation and planning for discharge  - Provide patient education as appropriate  Outcome: Progressing  Goal: Maintains/Returns to pre admission functional level  Description: INTERVENTIONS:  - Perform AM-PAC 6 Click Basic Mobility/ Daily Activity assessment daily.  - Set and communicate daily mobility goal to care team and patient/family/caregiver.   - Collaborate with rehabilitation services on mobility goals if consulted  - Dangle patient 2 times a day  - Stand patient 2 times a day  - Ambulate patient 2 times a day  - Out of bed to chair 2 times a day   - Out of bed for meals 3 times a day  - Out of bed for toileting  - Record patient progress and toleration of activity level   Outcome: Progressing

## 2024-08-26 NOTE — CONSULTS
"[facility]  Consult  Name: Mariano Lubin 49 y.o. male I MRN: [id number]  Unit/Bed#: -01 I Date of Admission: 8/25/2024   Date of Service: 8/26/2024 I Hospital Day: 1    Inpatient consult to Internal Medicine  Consult performed by: Cy Garcia MD  Consult ordered by: Shay Elise MD          Assessment & Plan   * Incarcerated ventral hernia  Assessment & Plan  Admitted under general surgery service  CT POA shows Mid small bowel obstruction with transition in the right side of a moderate umbilical hernia containing incarcerated bowel and fat   Further management per surgery service  Cont IV fluids    SIRS (systemic inflammatory response syndrome) (HCC)  Assessment & Plan  Leukocytosis now improved, continues to be tachycardic secondary to pain.  On empiric IV Flagyl per surgical services    Morbid obesity (HCC)  Assessment & Plan  Recommend incorporating a more whole foods plant-predominant diet along with decreasing consumption of red meats and processed foods  Per AHA guidelines, recommend moderate-vigorous intensity exercise for 30 minutes a day for 5 days a week or a total of 150 min/week  Counseled on lifestyle changes for >3 min      Type 2 diabetes mellitus with other specified complication, without long-term current use of insulin (HCC)  Assessment & Plan  No results found for: \"HGBA1C\"    No results for input(s): \"POCGLU\" in the last 72 hours.    Blood Sugar Average: Last 72 hrs:    On metformin at home, hold   Will start SSI  Check A1C level  Currently NPO    Anxiety  Assessment & Plan  Stable  May use prn IV ativan for severe anxiety    HTN (hypertension)  Assessment & Plan  /62 (BP Location: Right arm)   Pulse 105   Temp 97.8 °F (36.6 °C) (Oral)   Resp 18   Ht 5' 8\" (1.727 m)   SpO2 95%   BMI 79.31 kg/m²   Stable pressures  NPO status  Will use PRN labetalol if BP>180  mmhg           VTE Prophylaxis:    heparin    Mobility:   Basic Mobility Inpatient Raw Score: " 22  JH-HLM Goal: 7: Walk 25 feet or more  JH-HLM Achieved: 5: Stand (1 or more minutes)  JH-HLM Goal NOT achieved. Continue with multidisciplinary rounding and encourage appropriate mobility to improve upon JH-HLM goals.    Recommendations for Discharge:  Not applicable currently    Total Time Spent on Date of Encounter in care of patient: 55 mins. This time was spent on one or more of the following: performing physical exam; counseling and coordination of care; obtaining or reviewing history; documenting in the medical record; reviewing/ordering tests, medications or procedures; communicating with other healthcare professionals and discussing with patient's family/caregivers.    Collaboration of Care: Were Recommendations Directly Discussed with Primary Treatment Team? Yes    History of Present Illness:  Mariano Lubin is a 49 y.o. male who is originally admitted to the general surgery service due to incarcerated ventral hernia and SBO. We are consulted for medical management.  Patient seen and examined at bedside.  Complains of generalized abdominal pain and discomfort, 9/10, worsens with movement.  Denies any flatus or bowel movement this morning.  Denies any chest pain or shortness of breath.  Was unable to tolerate nasogastric tube and does not want it anymore.    Review of Systems:  Review of Systems   Constitutional:  Positive for activity change, appetite change and fatigue. Negative for chills and fever.   HENT:  Negative for ear pain and sore throat.    Eyes:  Negative for pain and visual disturbance.   Respiratory:  Negative for cough and shortness of breath.    Cardiovascular:  Negative for chest pain and palpitations.   Gastrointestinal:  Positive for abdominal distention, abdominal pain and nausea. Negative for vomiting.   Genitourinary:  Negative for dysuria and hematuria.   Musculoskeletal:  Negative for arthralgias and back pain.   Skin:  Negative for color change and rash.   Neurological:  Negative  "for seizures and syncope.   All other systems reviewed and are negative.      Past Medical and Surgical History:   Past Medical History:   Diagnosis Date    Cancer (HCC)     skin cancer on neck    Hypertension        Past Surgical History:   Procedure Laterality Date    VENTRAL HERNIA REPAIR N/A 6/18/2023    Procedure: REPAIR HERNIA VENTRAL, laparoscopic;  Surgeon: Shay Elise MD;  Location: [facility] OR;  Service: General       Meds/Allergies:  all medications and allergies reviewed    Allergies: No Known Allergies    Social History:  Marital Status: /Civil Union  Substance Use History:   Social History     Substance and Sexual Activity   Alcohol Use Never     Social History     Tobacco Use   Smoking Status Never   Smokeless Tobacco Never     Social History     Substance and Sexual Activity   Drug Use Never       Family History:  Family History   Problem Relation Age of Onset    COPD Father     Diabetes Father        Physical Exam:   Vitals:   Blood Pressure: 126/62 (08/26/24 0750)  Pulse: 105 (08/25/24 2336)  Temperature: 97.8 °F (36.6 °C) (08/26/24 0750)  Temp Source: Oral (08/26/24 0750)  Respirations: 18 (08/26/24 0750)  Height: 5' 8\" (172.7 cm) (08/26/24 0750)  SpO2: 95 % (08/26/24 0750)    Physical Exam   General- Awake, alert and oriented x 3, looks uncomfortable secondary to pain, anxious  HEENT- Normocephalic, atraumatic, oral mucosa- moist  Neck- Supple, No carotid bruit, no JVD  CVS- Normal S1/ S2, Regular rate and rhythm, No murmur, No edema  Respiratory system- B/L clear breath sounds, no wheezing  Abdomen- Soft, distended versus obese, generalized abdominal tenderness and guarding bowel sound- present 4 quads  Genitourinary- No suprapubic tenderness, No CVA tenderness  Skin- No new bruise or rash  Musculoskeletal- No gross deformity  Psych- No acute psychosis  CNS- CN II- XII grossly intact, No acute focal neurologic deficit noted      Additional Data:   Lab Results:    Results from last 7 " "days   Lab Units 08/26/24  0531   WBC Thousand/uL 7.80   HEMOGLOBIN g/dL 13.1   HEMATOCRIT % 40.4   PLATELETS Thousands/uL 316   SEGS PCT % 68   LYMPHO PCT % 15   MONO PCT % 15*   EOS PCT % 2     Results from last 7 days   Lab Units 08/26/24  0531   SODIUM mmol/L 139   POTASSIUM mmol/L 4.1   CHLORIDE mmol/L 103   CO2 mmol/L 31   BUN mg/dL 17   CREATININE mg/dL 0.92   ANION GAP mmol/L 5   CALCIUM mg/dL 8.9   ALBUMIN g/dL 3.7   TOTAL BILIRUBIN mg/dL 0.64   ALK PHOS U/L 53   ALT U/L 15   AST U/L 17   GLUCOSE RANDOM mg/dL 109             No results found for: \"HGBA1C\"  Results from last 7 days   Lab Units 08/26/24  1223   POC GLUCOSE mg/dl 98     Results from last 7 days   Lab Units 08/25/24  0658   LACTIC ACID mmol/L 1.9       Imaging: Reviewed radiology reports from this admission including: abdominal/pelvic CT  CT abdomen pelvis with contrast   Final Result by David Cox MD (08/25 1130)      Mid small bowel obstruction with transition in the right side of a moderate umbilical hernia containing incarcerated bowel and fat. Dilated small bowel distended up to 3.7 cm. No pneumatosis intestinalis.      Additional chronic findings and negatives as above.      I personally discussed this study with NAHOMY CORTES on 8/25/2024 at 11:28                     Workstation performed: [id number]             EKG, Pathology, and Other Studies Reviewed on Admission:   EKG:  NSR, first degree AV block noted.    ** Please Note: This note may have been constructed using a voice recognition system. **        "

## 2024-08-26 NOTE — CASE MANAGEMENT
Case Management Assessment & Discharge Planning Note    Patient name Mariano Lubin  Location /-01 MRN [id number]  : 1975 Date 2024       Current Admission Date: 2024  Current Admission Diagnosis:Incarcerated ventral hernia   Patient Active Problem List    Diagnosis Date Noted Date Diagnosed    SIRS (systemic inflammatory response syndrome) (HCC) 2024     HTN (hypertension) 2023     Anxiety 2023     Incarcerated ventral hernia 2023     Morbid obesity (HCC) 2023     Wheezing 2023     Type 2 diabetes mellitus with other specified complication, without long-term current use of insulin (HCC)        LOS (days): 1  Geometric Mean LOS (GMLOS) (days):   Days to GMLOS:     OBJECTIVE:    Risk of Unplanned Readmission Score: 8.68      Current admission status: Inpatient       Preferred Pharmacy:   [id number] - [place] - [address]  [address]  [facility] [place] [address]  Phone: [phone] Fax: [phone]    Primary Care Provider: Loren Valentin    Primary Insurance: AET[employer]  Secondary Insurance:     ASSESSMENT:  Active Health Care Proxies       Danyelle Lubin Health Care Agent - Spouse   Primary Phone: [phone] (Mobile)                 Advance Directives  Does patient have a Health Care POA?: Yes  Does patient have Advance Directives?: Yes  Advance Directives: Power of  for health care, Power of  for finance  Primary Contact: Patient's Wife (Danyelle)    Readmission Root Cause  30 Day Readmission: No    Patient Information  Admitted from:: Home  Mental Status: Alert  During Assessment patient was accompanied by: Spouse  Assessment information provided by:: Patient  Primary Caregiver: Self  Support Systems: Self, Family members, Spouse/significant other, Children  County of Residence: [place]  What city do you live in?: [place]  Home entry access options. Select all that apply.: Stairs  Number of steps to  enter home.: 1  Do the steps have railings?: No  Type of Current Residence: 2 story home  Upon entering residence, is there a bedroom on the main floor (no further steps)?: No  A bedroom is located on the following floor levels of residence (select all that apply):: 2nd Floor  Upon entering residence, is there a bathroom on the main floor (no further steps)?: Yes  Number of steps to 2nd floor from main floor: One Flight  Living Arrangements: Lives w/ Spouse/significant other  Is patient a ?: No    Activities of Daily Living Prior to Admission  Functional Status: Independent  Completes ADLs independently?: Yes  Ambulates independently?: Yes  Does patient use assisted devices?: No  Does patient currently own DME?: No  Does patient have a history of Outpatient Therapy (PT/OT)?: No  Does the patient have a history of Short-Term Rehab?: No  Does patient have a history of HHC?: No  Does patient currently have HHC?: No    Patient Information Continued  Income Source: Employed  Does patient have prescription coverage?: Yes (Patient confirmed that he uses [facility] in [place], and he denied any barriers to obtaining or affording prescriptions.)  Does patient receive dialysis treatments?: No  Does patient have a history of substance abuse?: No  Does patient have a history of Mental Health Diagnosis?: No    PHQ 2/9 Screening   Reviewed PHQ 2/9 Depression Screening Score?: No    Means of Transportation  Means of Transport to Appts:: Drives Self    Social Determinants of Health (SDOH)      Flowsheet Row Most Recent Value   Housing Stability    In the last 12 months, was there a time when you were not able to pay the mortgage or rent on time? N   In the past 12 months, how many times have you moved where you were living? 0   At any time in the past 12 months, were you homeless or living in a shelter (including now)? N   Transportation Needs    In the past 12 months, has lack of transportation kept you from medical  appointments or from getting medications? no   In the past 12 months, has lack of transportation kept you from meetings, work, or from getting things needed for daily living? No   Food Insecurity    Within the past 12 months, you worried that your food would run out before you got the money to buy more. Never true   Within the past 12 months, the food you bought just didn't last and you didn't have money to get more. Never true   Utilities    In the past 12 months has the electric, gas, oil, or water company threatened to shut off services in your home? No        DISCHARGE DETAILS:    Discharge planning discussed with:: Patient and Patient's Wife  Freedom of Choice: Yes  Comments - Freedom of Choice: CM discussed FOC as it pertains to discharge planning. Patient denied any needs at this time and prefers to return home. His wife will provide transportation home once he is discharged.  CM contacted family/caregiver?: Yes  Were Treatment Team discharge recommendations reviewed with patient/caregiver?: Yes  Did patient/caregiver verbalize understanding of patient care needs?: Yes  Were patient/caregiver advised of the risks associated with not following Treatment Team discharge recommendations?: Yes    Contacts  Patient Contacts: Danyelle  Relationship to Patient:: Family  Contact Method: In Person  Reason/Outcome: Continuity of Care, Discharge Planning    Requested Home Health Care         Is the patient interested in HHC at discharge?: No    DME Referral Provided  Referral made for DME?: No    Other Referral/Resources/Interventions Provided:  Interventions: None Indicated    Would you like to participate in our [facility] service program?  : No - Declined    Treatment Team Recommendation: Home  Discharge Destination Plan:: Home  Transport at Discharge : Family

## 2024-08-26 NOTE — ASSESSMENT & PLAN NOTE
"/62 (BP Location: Right arm)   Pulse 105   Temp 97.8 °F (36.6 °C) (Oral)   Resp 18   Ht 5' 8\" (1.727 m)   SpO2 95%   BMI 79.31 kg/m²   Stable pressures  NPO status  Will use PRN labetalol if BP>180  mmhg    "

## 2024-08-26 NOTE — ASSESSMENT & PLAN NOTE
Leukocytosis now improved, continues to be tachycardic secondary to pain.  On empiric IV Flagyl per surgical services

## 2024-08-26 NOTE — PROGRESS NOTES
"Progress Note - General Surgery   Mariano Lubin 49 y.o. male MRN: [id number]  Unit/Bed#: -01 Encounter: [id number]      Assessment:   49-year-old male with recurrent incarcerated ventral hernia  -Patient underwent umbilical hernia repair with mesh in 2023    Morbid obesity    Hypertension    Plan:  -Recommend patient allow NGT placement for decompression  -Continue conservative management with n.p.o., IVF, NGT, and bowel rest  -Will advance diet if patient has return of bowel function  -Serial abdominal exams  -Continue to monitor vitals and lab results    Subjective/Objective   Chief Complaint: Generalized abdominal pain    Subjective: No acute events overnight.  Patient denied any bowel function this morning but reports some flatus later in the afternoon.  Patient reports abdominal pain has improved.  Denies any chest pain, shortness of breath, palpitations, fevers, chills.    Objective:     Blood pressure 126/62, pulse 105, temperature 97.8 °F (36.6 °C), temperature source Oral, resp. rate 18, height 5' 8\" (1.727 m), SpO2 95%.  Body mass index is 79.31 kg/m².    I/O         08/24 0701  08/25 0700 08/25 0701  08/26 0700 08/26 0701  08/27 0700    P.O.  0     I.V.  1935.4     IV Piggyback  1200     Total Intake  3135.4     Net  +3135.4            Unmeasured Urine Occurrence  3 x             Invasive Devices       Peripheral Intravenous Line  Duration             Peripheral IV 08/25/24 Left Antecubital 1 day    Peripheral IV 08/25/24 Right;Ventral (anterior) Forearm <1 day                    Physical Exam: /64   Pulse 82   Temp 98.6 °F (37 °C)   Resp 18   Ht 5' 8\" (1.727 m)   SpO2 96%   BMI 79.31 kg/m²   General appearance: alert and oriented, in no acute distress  Lungs: diminished breath sounds  Heart: regular rate and rhythm  Abdomen:  Obese, soft, midline tenderness to palpation, palpable hernia that is reducible and soft  Extremities: extremities normal, warm and well-perfused; no cyanosis, " clubbing, or edema    Labs   Recent Results (from the past 24 hour(s))   Comprehensive metabolic panel    Collection Time: 08/26/24  5:31 AM   Result Value Ref Range    Sodium 139 135 - 147 mmol/L    Potassium 4.1 3.5 - 5.3 mmol/L    Chloride 103 96 - 108 mmol/L    CO2 31 21 - 32 mmol/L    ANION GAP 5 4 - 13 mmol/L    BUN 17 5 - 25 mg/dL    Creatinine 0.92 0.60 - 1.30 mg/dL    Glucose 109 65 - 140 mg/dL    Calcium 8.9 8.4 - 10.2 mg/dL    AST 17 13 - 39 U/L    ALT 15 7 - 52 U/L    Alkaline Phosphatase 53 34 - 104 U/L    Total Protein 7.1 6.4 - 8.4 g/dL    Albumin 3.7 3.5 - 5.0 g/dL    Total Bilirubin 0.64 0.20 - 1.00 mg/dL    eGFR 95 ml/min/1.73sq m   Magnesium    Collection Time: 08/26/24  5:31 AM   Result Value Ref Range    Magnesium 2.3 1.9 - 2.7 mg/dL   Phosphorus    Collection Time: 08/26/24  5:31 AM   Result Value Ref Range    Phosphorus 2.9 2.7 - 4.5 mg/dL   CBC and differential    Collection Time: 08/26/24  5:31 AM   Result Value Ref Range    WBC 7.80 4.31 - 10.16 Thousand/uL    RBC 4.44 3.88 - 5.62 Million/uL    Hemoglobin 13.1 12.0 - 17.0 g/dL    Hematocrit 40.4 36.5 - 49.3 %    MCV 91 82 - 98 fL    MCH 29.5 26.8 - 34.3 pg    MCHC 32.4 31.4 - 37.4 g/dL    RDW 15.6 (H) 11.6 - 15.1 %    MPV 8.9 8.9 - 12.7 fL    Platelets 316 149 - 390 Thousands/uL    nRBC 0 /100 WBCs    Segmented % 68 43 - 75 %    Immature Grans % 0 0 - 2 %    Lymphocytes % 15 14 - 44 %    Monocytes % 15 (H) 4 - 12 %    Eosinophils Relative 2 0 - 6 %    Basophils Relative 0 0 - 1 %    Absolute Neutrophils 5.27 1.85 - 7.62 Thousands/µL    Absolute Immature Grans 0.01 0.00 - 0.20 Thousand/uL    Absolute Lymphocytes 1.15 0.60 - 4.47 Thousands/µL    Absolute Monocytes 1.20 0.17 - 1.22 Thousand/µL    Eosinophils Absolute 0.15 0.00 - 0.61 Thousand/µL    Basophils Absolute 0.02 0.00 - 0.10 Thousands/µL        Imaging and other studies:  CT abdomen pelvis with contrast    Result Date: 8/25/2024  Impression: Mid small bowel obstruction with transition  in the right side of a moderate umbilical hernia containing incarcerated bowel and fat. Dilated small bowel distended up to 3.7 cm. No pneumatosis intestinalis. Additional chronic findings and negatives as above. I personally discussed this study with NAHOMY CORTES on 8/25/2024 at 11:28 Workstation performed: [id number]       VTE Pharmacologic Prophylaxis: Heparin  VTE Mechanical Prophylaxis: sequential compression device    Tonio Ansari PA-C  8/26/2024

## 2024-08-27 LAB
ANION GAP SERPL CALCULATED.3IONS-SCNC: 4 MMOL/L (ref 4–13)
BASOPHILS # BLD AUTO: 0.02 THOUSANDS/ÂΜL (ref 0–0.1)
BASOPHILS NFR BLD AUTO: 0 % (ref 0–1)
BUN SERPL-MCNC: 17 MG/DL (ref 5–25)
CALCIUM SERPL-MCNC: 8.4 MG/DL (ref 8.4–10.2)
CHLORIDE SERPL-SCNC: 105 MMOL/L (ref 96–108)
CO2 SERPL-SCNC: 29 MMOL/L (ref 21–32)
CREAT SERPL-MCNC: 0.92 MG/DL (ref 0.6–1.3)
EOSINOPHIL # BLD AUTO: 0.16 THOUSAND/ÂΜL (ref 0–0.61)
EOSINOPHIL NFR BLD AUTO: 2 % (ref 0–6)
ERYTHROCYTE [DISTWIDTH] IN BLOOD BY AUTOMATED COUNT: 15.3 % (ref 11.6–15.1)
GFR SERPL CREATININE-BSD FRML MDRD: 95 ML/MIN/1.73SQ M
GLUCOSE SERPL-MCNC: 103 MG/DL (ref 65–140)
GLUCOSE SERPL-MCNC: 112 MG/DL (ref 65–140)
GLUCOSE SERPL-MCNC: 114 MG/DL (ref 65–140)
GLUCOSE SERPL-MCNC: 114 MG/DL (ref 65–140)
GLUCOSE SERPL-MCNC: 116 MG/DL (ref 65–140)
GLUCOSE SERPL-MCNC: 125 MG/DL (ref 65–140)
GLUCOSE SERPL-MCNC: 94 MG/DL (ref 65–140)
HCT VFR BLD AUTO: 36.5 % (ref 36.5–49.3)
HGB BLD-MCNC: 11.9 G/DL (ref 12–17)
IMM GRANULOCYTES # BLD AUTO: 0.01 THOUSAND/UL (ref 0–0.2)
IMM GRANULOCYTES NFR BLD AUTO: 0 % (ref 0–2)
LYMPHOCYTES # BLD AUTO: 1.51 THOUSANDS/ÂΜL (ref 0.6–4.47)
LYMPHOCYTES NFR BLD AUTO: 19 % (ref 14–44)
MCH RBC QN AUTO: 29.6 PG (ref 26.8–34.3)
MCHC RBC AUTO-ENTMCNC: 32.6 G/DL (ref 31.4–37.4)
MCV RBC AUTO: 91 FL (ref 82–98)
MONOCYTES # BLD AUTO: 1.35 THOUSAND/ÂΜL (ref 0.17–1.22)
MONOCYTES NFR BLD AUTO: 17 % (ref 4–12)
NEUTROPHILS # BLD AUTO: 5.11 THOUSANDS/ÂΜL (ref 1.85–7.62)
NEUTS SEG NFR BLD AUTO: 62 % (ref 43–75)
NRBC BLD AUTO-RTO: 0 /100 WBCS
PLATELET # BLD AUTO: 264 THOUSANDS/UL (ref 149–390)
PMV BLD AUTO: 9 FL (ref 8.9–12.7)
POTASSIUM SERPL-SCNC: 4.1 MMOL/L (ref 3.5–5.3)
RBC # BLD AUTO: 4.02 MILLION/UL (ref 3.88–5.62)
SODIUM SERPL-SCNC: 138 MMOL/L (ref 135–147)
WBC # BLD AUTO: 8.16 THOUSAND/UL (ref 4.31–10.16)

## 2024-08-27 PROCEDURE — 82948 REAGENT STRIP/BLOOD GLUCOSE: CPT

## 2024-08-27 PROCEDURE — 99232 SBSQ HOSP IP/OBS MODERATE 35: CPT | Performed by: STUDENT IN AN ORGANIZED HEALTH CARE EDUCATION/TRAINING PROGRAM

## 2024-08-27 PROCEDURE — 85025 COMPLETE CBC W/AUTO DIFF WBC: CPT | Performed by: PHYSICIAN ASSISTANT

## 2024-08-27 PROCEDURE — 80048 BASIC METABOLIC PNL TOTAL CA: CPT | Performed by: PHYSICIAN ASSISTANT

## 2024-08-27 PROCEDURE — 99232 SBSQ HOSP IP/OBS MODERATE 35: CPT

## 2024-08-27 RX ORDER — OXYCODONE HYDROCHLORIDE 10 MG/1
10 TABLET ORAL EVERY 4 HOURS PRN
Status: DISCONTINUED | OUTPATIENT
Start: 2024-08-27 | End: 2024-08-28 | Stop reason: HOSPADM

## 2024-08-27 RX ORDER — ACETAMINOPHEN 325 MG/1
650 TABLET ORAL EVERY 6 HOURS PRN
Status: DISCONTINUED | OUTPATIENT
Start: 2024-08-27 | End: 2024-08-28 | Stop reason: HOSPADM

## 2024-08-27 RX ORDER — HEPARIN SODIUM 5000 [USP'U]/ML
7500 INJECTION, SOLUTION INTRAVENOUS; SUBCUTANEOUS EVERY 8 HOURS SCHEDULED
Status: DISCONTINUED | OUTPATIENT
Start: 2024-08-27 | End: 2024-08-28 | Stop reason: HOSPADM

## 2024-08-27 RX ORDER — OXYCODONE HYDROCHLORIDE 5 MG/1
5 TABLET ORAL EVERY 4 HOURS PRN
Status: DISCONTINUED | OUTPATIENT
Start: 2024-08-27 | End: 2024-08-28 | Stop reason: HOSPADM

## 2024-08-27 RX ORDER — SIMETHICONE 80 MG
80 TABLET,CHEWABLE ORAL EVERY 6 HOURS PRN
Status: DISCONTINUED | OUTPATIENT
Start: 2024-08-27 | End: 2024-08-28 | Stop reason: HOSPADM

## 2024-08-27 RX ORDER — CALCIUM CARBONATE 500 MG/1
500 TABLET, CHEWABLE ORAL DAILY PRN
Status: DISCONTINUED | OUTPATIENT
Start: 2024-08-27 | End: 2024-08-28 | Stop reason: HOSPADM

## 2024-08-27 RX ORDER — DOCUSATE SODIUM 100 MG/1
100 CAPSULE, LIQUID FILLED ORAL 2 TIMES DAILY
Status: DISCONTINUED | OUTPATIENT
Start: 2024-08-27 | End: 2024-08-28 | Stop reason: HOSPADM

## 2024-08-27 RX ORDER — KETOROLAC TROMETHAMINE 30 MG/ML
15 INJECTION, SOLUTION INTRAMUSCULAR; INTRAVENOUS EVERY 6 HOURS PRN
Status: COMPLETED | OUTPATIENT
Start: 2024-08-27 | End: 2024-08-27

## 2024-08-27 RX ORDER — KETOROLAC TROMETHAMINE 30 MG/ML
15 INJECTION, SOLUTION INTRAMUSCULAR; INTRAVENOUS EVERY 6 HOURS PRN
Status: DISCONTINUED | OUTPATIENT
Start: 2024-08-27 | End: 2024-08-27

## 2024-08-27 RX ADMIN — KETOROLAC TROMETHAMINE 15 MG: 30 INJECTION, SOLUTION INTRAMUSCULAR; INTRAVENOUS at 20:24

## 2024-08-27 RX ADMIN — OXYCODONE HYDROCHLORIDE 5 MG: 5 TABLET ORAL at 11:25

## 2024-08-27 RX ADMIN — OXYCODONE HYDROCHLORIDE 10 MG: 10 TABLET ORAL at 18:35

## 2024-08-27 RX ADMIN — METRONIDAZOLE 500 MG: 500 INJECTION, SOLUTION INTRAVENOUS at 03:45

## 2024-08-27 RX ADMIN — ONDANSETRON 4 MG: 2 INJECTION INTRAMUSCULAR; INTRAVENOUS at 19:42

## 2024-08-27 RX ADMIN — KETOROLAC TROMETHAMINE 15 MG: 30 INJECTION, SOLUTION INTRAMUSCULAR; INTRAVENOUS at 08:18

## 2024-08-27 RX ADMIN — PANTOPRAZOLE SODIUM 40 MG: 40 INJECTION, POWDER, FOR SOLUTION INTRAVENOUS at 08:18

## 2024-08-27 RX ADMIN — MORPHINE SULFATE 2 MG: 2 INJECTION, SOLUTION INTRAMUSCULAR; INTRAVENOUS at 05:32

## 2024-08-27 RX ADMIN — METRONIDAZOLE 500 MG: 500 INJECTION, SOLUTION INTRAVENOUS at 11:26

## 2024-08-27 RX ADMIN — MORPHINE SULFATE 2 MG: 2 INJECTION, SOLUTION INTRAMUSCULAR; INTRAVENOUS at 01:19

## 2024-08-27 RX ADMIN — OXYMETAZOLINE HYDROCHLORIDE 2 SPRAY: 0.5 SPRAY NASAL at 21:01

## 2024-08-27 RX ADMIN — KETOROLAC TROMETHAMINE 15 MG: 30 INJECTION, SOLUTION INTRAMUSCULAR; INTRAVENOUS at 02:08

## 2024-08-27 RX ADMIN — SIMETHICONE 80 MG: 80 TABLET, CHEWABLE ORAL at 20:59

## 2024-08-27 RX ADMIN — KETOROLAC TROMETHAMINE 15 MG: 30 INJECTION, SOLUTION INTRAMUSCULAR; INTRAVENOUS at 14:46

## 2024-08-27 RX ADMIN — HEPARIN SODIUM 7500 UNITS: 5000 INJECTION INTRAVENOUS; SUBCUTANEOUS at 14:47

## 2024-08-27 RX ADMIN — HEPARIN SODIUM 5000 UNITS: 5000 INJECTION INTRAVENOUS; SUBCUTANEOUS at 05:32

## 2024-08-27 RX ADMIN — HEPARIN SODIUM 7500 UNITS: 5000 INJECTION INTRAVENOUS; SUBCUTANEOUS at 22:06

## 2024-08-27 RX ADMIN — SODIUM CHLORIDE, SODIUM LACTATE, POTASSIUM CHLORIDE, AND CALCIUM CHLORIDE 125 ML/HR: .6; .31; .03; .02 INJECTION, SOLUTION INTRAVENOUS at 14:44

## 2024-08-27 RX ADMIN — DOCUSATE SODIUM 100 MG: 100 CAPSULE, LIQUID FILLED ORAL at 18:35

## 2024-08-27 RX ADMIN — SODIUM CHLORIDE, SODIUM LACTATE, POTASSIUM CHLORIDE, AND CALCIUM CHLORIDE 125 ML/HR: .6; .31; .03; .02 INJECTION, SOLUTION INTRAVENOUS at 05:37

## 2024-08-27 RX ADMIN — ONDANSETRON 4 MG: 2 INJECTION INTRAMUSCULAR; INTRAVENOUS at 01:22

## 2024-08-27 RX ADMIN — CALCIUM CARBONATE (ANTACID) CHEW TAB 500 MG 500 MG: 500 CHEW TAB at 22:06

## 2024-08-27 NOTE — ASSESSMENT & PLAN NOTE
Lab Results   Component Value Date    HGBA1C 5.9 (H) 08/26/2024       Recent Labs     08/26/24  1539 08/27/24  0108 08/27/24  0618 08/27/24  1150   POCGLU 93 114 112 94       Blood Sugar Average: Last 72 hrs:  (P) 102.2  Currently on clear liquid diet  Holding metformin and Ozempic  A1c well-controlled at 5.9

## 2024-08-27 NOTE — PLAN OF CARE
Problem: PAIN - ADULT  Goal: Verbalizes/displays adequate comfort level or baseline comfort level  Description: Interventions:  - Encourage patient to monitor pain and request assistance  - Assess pain using appropriate pain scale  - Administer analgesics based on type and severity of pain and evaluate response  - Implement non-pharmacological measures as appropriate and evaluate response  - Consider cultural and social influences on pain and pain management  - Notify physician/advanced practitioner if interventions unsuccessful or patient reports new pain  Outcome: Progressing     Problem: INFECTION - ADULT  Goal: Absence or prevention of progression during hospitalization  Description: INTERVENTIONS:  - Assess and monitor for signs and symptoms of infection  - Monitor lab/diagnostic results  - Monitor all insertion sites, i.e. indwelling lines, tubes, and drains  - Monitor endotracheal if appropriate and nasal secretions for changes in amount and color  - [place] appropriate cooling/warming therapies per order  - Administer medications as ordered  - Instruct and encourage patient and family to use good hand hygiene technique  - Identify and instruct in appropriate isolation precautions for identified infection/condition  Outcome: Progressing  Goal: Absence of fever/infection during neutropenic period  Description: INTERVENTIONS:  - Monitor WBC    Outcome: Progressing     Problem: SAFETY ADULT  Goal: Patient will remain free of falls  Description: INTERVENTIONS:  - Educate patient/family on patient safety including physical limitations  - Instruct patient to call for assistance with activity   - Consult OT/PT to assist with strengthening/mobility   - Keep Call bell within reach  - Keep bed low and locked with side rails adjusted as appropriate  - Keep care items and personal belongings within reach  - Initiate and maintain comfort rounds  - Make Fall Risk Sign visible to staff  - Offer Toileting every 2 Hours,  in advance of need  - Initiate/Maintain bed alarm  - Obtain necessary fall risk management equipment  - Apply yellow socks and bracelet for high fall risk patients  - Consider moving patient to room near nurses station  Outcome: Progressing  Goal: Maintain or return to baseline ADL function  Description: INTERVENTIONS:  -  Assess patient's ability to carry out ADLs; assess patient's baseline for ADL function and identify physical deficits which impact ability to perform ADLs (bathing, care of mouth/teeth, toileting, grooming, dressing, etc.)  - Assess/evaluate cause of self-care deficits   - Assess range of motion  - Assess patient's mobility; develop plan if impaired  - Assess patient's need for assistive devices and provide as appropriate  - Encourage maximum independence but intervene and supervise when necessary  - Involve family in performance of ADLs  - Assess for home care needs following discharge   - Consider OT consult to assist with ADL evaluation and planning for discharge  - Provide patient education as appropriate  Outcome: Progressing  Goal: Maintains/Returns to pre admission functional level  Description: INTERVENTIONS:  - Perform AM-PAC 6 Click Basic Mobility/ Daily Activity assessment daily.  - Set and communicate daily mobility goal to care team and patient/family/caregiver.   - Collaborate with rehabilitation services on mobility goals if consulted  - Perform Range of Motion 4 times a day.  - Reposition patient every 2 hours.  - Dangle patient 4 times a day  - Stand patient 4 times a day  - Ambulate patient 4 times a day  - Out of bed to chair 4 times a day   - Out of bed for meals 4 times a day  - Out of bed for toileting  - Record patient progress and toleration of activity level   Outcome: Progressing     Problem: DISCHARGE PLANNING  Goal: Discharge to home or other facility with appropriate resources  Description: INTERVENTIONS:  - Identify barriers to discharge w/patient and caregiver  -  Arrange for needed discharge resources and transportation as appropriate  - Identify discharge learning needs (meds, wound care, etc.)  - Arrange for interpretive services to assist at discharge as needed  - Refer to Case Management Department for coordinating discharge planning if the patient needs post-hospital services based on physician/advanced practitioner order or complex needs related to functional status, cognitive ability, or social support system  Outcome: Progressing     Problem: Knowledge Deficit  Goal: Patient/family/caregiver demonstrates understanding of disease process, treatment plan, medications, and discharge instructions  Description: Complete learning assessment and assess knowledge base.  Interventions:  - Provide teaching at level of understanding  - Provide teaching via preferred learning methods  Outcome: Progressing     Problem: GASTROINTESTINAL - ADULT  Goal: Minimal or absence of nausea and/or vomiting  Description: INTERVENTIONS:  - Administer IV fluids if ordered to ensure adequate hydration  - Maintain NPO status until nausea and vomiting are resolved  - Nasogastric tube if ordered  - Administer ordered antiemetic medications as needed  - Provide nonpharmacologic comfort measures as appropriate  - Advance diet as tolerated, if ordered  - Consider nutrition services referral to assist patient with adequate nutrition and appropriate food choices  Outcome: Progressing  Goal: Maintains or returns to baseline bowel function  Description: INTERVENTIONS:  - Assess bowel function  - Encourage oral fluids to ensure adequate hydration  - Administer IV fluids if ordered to ensure adequate hydration  - Administer ordered medications as needed  - Encourage mobilization and activity  - Consider nutritional services referral to assist patient with adequate nutrition and appropriate food choices  Outcome: Progressing  Goal: Maintains adequate nutritional intake  Description: INTERVENTIONS:  - Monitor  percentage of each meal consumed  - Identify factors contributing to decreased intake, treat as appropriate  - Assist with meals as needed  - Monitor I&O, weight, and lab values if indicated  - Obtain nutrition services referral as needed  Outcome: Progressing     Problem: METABOLIC, FLUID AND ELECTROLYTES - ADULT  Goal: Electrolytes maintained within normal limits  Description: INTERVENTIONS:  - Monitor labs and assess patient for signs and symptoms of electrolyte imbalances  - Administer electrolyte replacement as ordered  - Monitor response to electrolyte replacements, including repeat lab results as appropriate  - Instruct patient on fluid and nutrition as appropriate  Outcome: Progressing  Goal: Fluid balance maintained  Description: INTERVENTIONS:  - Monitor labs   - Monitor I/O and WT  - Instruct patient on fluid and nutrition as appropriate  - Assess for signs & symptoms of volume excess or deficit  Outcome: Progressing     Problem: SKIN/TISSUE INTEGRITY - ADULT  Goal: Skin Integrity remains intact(Skin Breakdown Prevention)  Description: Assess:  -Perform Gilbert assessment every shift  -Clean and moisturize skin every shift and as needed  -Inspect skin when repositioning, toileting, and assisting with ADLS  -Assess under medical devices    Bed Management:  -Have minimal linens on bed & keep smooth, unwrinkled  -Change linens as needed when moist or perspiring  -Avoid sitting or lying in one position for more than 2 hours while in bed    Toileting:  -Offer bedside commode  -Assess for incontinence every shift and as needed  -Use incontinent care products after each incontinent episode    Activity:  -Mobilize patient 4 times a day  -Encourage activity and walks on unit  -Encourage or provide ROM exercises   -Turn and reposition patient every 2 Hours  -Use appropriate equipment to lift or move patient in bed  -Instruct/ Assist with weight shifting every 30 minutes when out of bed in chair  -Consider limitation  of chair time 2 hour intervals    Skin Care:  -Avoid use of baby powder, tape, friction and shearing, hot water or constrictive clothing  -Relieve pressure over bony prominences  -Do not massage red bony areas    Next Steps:  -Teach patient strategies to minimize risks   -Consider consults to  interdisciplinary teams  Outcome: Progressing     Problem: HEMATOLOGIC - ADULT  Goal: Maintains hematologic stability  Description: INTERVENTIONS  - Assess for signs and symptoms of bleeding or hemorrhage  - Monitor labs  - Administer supportive blood products/factors as ordered and appropriate  Outcome: Progressing

## 2024-08-27 NOTE — ASSESSMENT & PLAN NOTE
"/62   Pulse 77   Temp 98.6 °F (37 °C)   Resp 18   Ht 5' 8\" (1.727 m)   SpO2 93%   BMI 79.31 kg/m²   Stable pressures  Not requiring any medications at this time, can likely discontinue Micardis on discharge  "

## 2024-08-27 NOTE — ASSESSMENT & PLAN NOTE
Leukocytosis now improved, continues to be tachycardic secondary to pain  Clear signs of infection.  Discontinue Flagyl

## 2024-08-27 NOTE — PLAN OF CARE
Problem: PAIN - ADULT  Goal: Verbalizes/displays adequate comfort level or baseline comfort level  Description: Interventions:  - Encourage patient to monitor pain and request assistance  - Assess pain using appropriate pain scale  - Administer analgesics based on type and severity of pain and evaluate response  - Implement non-pharmacological measures as appropriate and evaluate response  - Consider cultural and social influences on pain and pain management  - Notify physician/advanced practitioner if interventions unsuccessful or patient reports new pain  Outcome: Progressing     Problem: INFECTION - ADULT  Goal: Absence or prevention of progression during hospitalization  Description: INTERVENTIONS:  - Assess and monitor for signs and symptoms of infection  - Monitor lab/diagnostic results  - Monitor all insertion sites, i.e. indwelling lines, tubes, and drains  - Monitor endotracheal if appropriate and nasal secretions for changes in amount and color  - [place] appropriate cooling/warming therapies per order  - Administer medications as ordered  - Instruct and encourage patient and family to use good hand hygiene technique  - Identify and instruct in appropriate isolation precautions for identified infection/condition  Outcome: Progressing     Problem: SAFETY ADULT  Goal: Patient will remain free of falls  Description: INTERVENTIONS:  - Educate patient/family on patient safety including physical limitations  - Instruct patient to call for assistance with activity   - Consult OT/PT to assist with strengthening/mobility   - Keep Call bell within reach  - Keep bed low and locked with side rails adjusted as appropriate  - Keep care items and personal belongings within reach  - Initiate and maintain comfort rounds  - Make Fall Risk Sign visible to staff  - Offer Toileting every 2 Hours, in advance of need  - Initiate/Maintain bed alarm  - Obtain necessary fall risk management equipment: yellow socks  - Apply  yellow socks and bracelet for high fall risk patients  - Consider moving patient to room near nurses station  Outcome: Progressing  Goal: Maintain or return to baseline ADL function  Description: INTERVENTIONS:  -  Assess patient's ability to carry out ADLs; assess patient's baseline for ADL function and identify physical deficits which impact ability to perform ADLs (bathing, care of mouth/teeth, toileting, grooming, dressing, etc.)  - Assess/evaluate cause of self-care deficits   - Assess range of motion  - Assess patient's mobility; develop plan if impaired  - Assess patient's need for assistive devices and provide as appropriate  - Encourage maximum independence but intervene and supervise when necessary  - Involve family in performance of ADLs  - Assess for home care needs following discharge   - Consider OT consult to assist with ADL evaluation and planning for discharge  - Provide patient education as appropriate  Outcome: Progressing  Goal: Maintains/Returns to pre admission functional level  Description: INTERVENTIONS:  - Perform AM-PAC 6 Click Basic Mobility/ Daily Activity assessment daily.  - Set and communicate daily mobility goal to care team and patient/family/caregiver.   - Collaborate with rehabilitation services on mobility goals if consulted  - Perform Range of Motion 2 times a day.  - Reposition patient every 2 hours.  - Dangle patient 2 times a day  - Stand patient 2 times a day  - Ambulate patient 2 times a day  - Out of bed to chair 2 times a day   - Out of bed for meals 2 times a day  - Out of bed for toileting  - Record patient progress and toleration of activity level   Outcome: Progressing     Problem: GASTROINTESTINAL - ADULT  Goal: Minimal or absence of nausea and/or vomiting  Description: INTERVENTIONS:  - Administer IV fluids if ordered to ensure adequate hydration  - Maintain NPO status until nausea and vomiting are resolved  - Nasogastric tube if ordered  - Administer ordered  antiemetic medications as needed  - Provide nonpharmacologic comfort measures as appropriate  - Advance diet as tolerated, if ordered  - Consider nutrition services referral to assist patient with adequate nutrition and appropriate food choices  Outcome: Progressing  Goal: Maintains or returns to baseline bowel function  Description: INTERVENTIONS:  - Assess bowel function  - Encourage oral fluids to ensure adequate hydration  - Administer IV fluids if ordered to ensure adequate hydration  - Administer ordered medications as needed  - Encourage mobilization and activity  - Consider nutritional services referral to assist patient with adequate nutrition and appropriate food choices  Outcome: Progressing  Goal: Maintains adequate nutritional intake  Description: INTERVENTIONS:  - Monitor percentage of each meal consumed  - Identify factors contributing to decreased intake, treat as appropriate  - Assist with meals as needed  - Monitor I&O, weight, and lab values if indicated  - Obtain nutrition services referral as needed  Outcome: Progressing     Problem: METABOLIC, FLUID AND ELECTROLYTES - ADULT  Goal: Electrolytes maintained within normal limits  Description: INTERVENTIONS:  - Monitor labs and assess patient for signs and symptoms of electrolyte imbalances  - Administer electrolyte replacement as ordered  - Monitor response to electrolyte replacements, including repeat lab results as appropriate  - Instruct patient on fluid and nutrition as appropriate  Outcome: Progressing  Goal: Fluid balance maintained  Description: INTERVENTIONS:  - Monitor labs   - Monitor I/O and WT  - Instruct patient on fluid and nutrition as appropriate  - Assess for signs & symptoms of volume excess or deficit  Outcome: Progressing     Problem: HEMATOLOGIC - ADULT  Goal: Maintains hematologic stability  Description: INTERVENTIONS  - Assess for signs and symptoms of bleeding or hemorrhage  - Monitor labs  - Administer supportive blood  products/factors as ordered and appropriate  Outcome: Progressing

## 2024-08-27 NOTE — PROGRESS NOTES
"[facility]  Progress Note  Name: Mariano Lubin I  MRN: [id number]  Unit/Bed#: -01 I Date of Admission: 8/25/2024   Date of Service: 8/27/2024 I Hospital Day: 2    Assessment & Plan   SIRS (systemic inflammatory response syndrome) (HCC)  Assessment & Plan  Leukocytosis now improved, continues to be tachycardic secondary to pain  Clear signs of infection.  Discontinue Flagyl    Morbid obesity (HCC)  Assessment & Plan  Currently on Ozempic, reportedly lost over 70 pounds in 1 year.  Recommend to continue    Type 2 diabetes mellitus with other specified complication, without long-term current use of insulin (HCC)  Assessment & Plan  Lab Results   Component Value Date    HGBA1C 5.9 (H) 08/26/2024       Recent Labs     08/26/24  1539 08/27/24  0108 08/27/24  0618 08/27/24  1150   POCGLU 93 114 112 94       Blood Sugar Average: Last 72 hrs:  (P) 102.2  Currently on clear liquid diet  Holding metformin and Ozempic  A1c well-controlled at 5.9    HTN (hypertension)  Assessment & Plan  /62   Pulse 77   Temp 98.6 °F (37 °C)   Resp 18   Ht 5' 8\" (1.727 m)   SpO2 93%   BMI 79.31 kg/m²   Stable pressures  Not requiring any medications at this time, can likely discontinue Micardis on discharge    * Incarcerated ventral hernia  Assessment & Plan  Admitted under general surgery service  CT POA shows Mid small bowel obstruction with transition in the right side of a moderate umbilical hernia containing incarcerated bowel and fat   Further management per surgery service  Cont IV fluids, DVT prophylaxis with heparin  Plan to advance diet to clear liquids, potentially further later this afternoon  If tolerating can potentially discharge in the next 24 hours             VTE Pharmacologic Prophylaxis:   Pharmacologic: Heparin  Mechanical VTE Prophylaxis in Place: Yes    Current Length of Stay: 2 day(s)    Current Patient Status: Inpatient   Certification Statement: The patient will continue to " require additional inpatient hospital stay due to advancing diet    Discharge Plan: 24 hours    Code Status: Level 1 - Full Code      Subjective:   No events overnight.  Pain has been improved.  Denies any nausea or vomiting.  Diet was advanced patient currently tolerating.  Ambulating without difficulty.    Objective:     Vitals:   Temp (24hrs), Av.5 °F (36.9 °C), Min:98.3 °F (36.8 °C), Max:98.6 °F (37 °C)    Temp:  [98.3 °F (36.8 °C)-98.6 °F (37 °C)] 98.6 °F (37 °C)  HR:  [77-82] 77  Resp:  [12-18] 18  BP: (109-117)/(62-70) 117/62  SpO2:  [93 %-96 %] 93 %  Body mass index is 79.31 kg/m².     Input and Output Summary (last 24 hours):     No intake or output data in the 24 hours ending 24 1244    Physical Exam:     Physical Exam  Vitals and nursing note reviewed.   Constitutional:       Appearance: Normal appearance. He is obese.   HENT:      Head: Normocephalic.   Eyes:      Conjunctiva/sclera: Conjunctivae normal.   Cardiovascular:      Rate and Rhythm: Normal rate.   Pulmonary:      Effort: Pulmonary effort is normal. No respiratory distress.   Abdominal:      General: There is no distension.      Palpations: Abdomen is soft.      Tenderness: There is no abdominal tenderness.   Musculoskeletal:         General: No swelling. Normal range of motion.   Skin:     General: Skin is warm and dry.   Neurological:      General: No focal deficit present.      Mental Status: He is alert. Mental status is at baseline.       Additional Data:     Labs:    Results from last 7 days   Lab Units 24  0622   WBC Thousand/uL 8.16   HEMOGLOBIN g/dL 11.9*   HEMATOCRIT % 36.5   PLATELETS Thousands/uL 264   SEGS PCT % 62   LYMPHO PCT % 19   MONO PCT % 17*   EOS PCT % 2     Results from last 7 days   Lab Units 24  0622 24  0531   SODIUM mmol/L 138 139   POTASSIUM mmol/L 4.1 4.1   CHLORIDE mmol/L 105 103   CO2 mmol/L 29 31   BUN mg/dL 17 17   CREATININE mg/dL 0.92 0.92   ANION GAP mmol/L 4 5   CALCIUM mg/dL 8.4  8.9   ALBUMIN g/dL  --  3.7   TOTAL BILIRUBIN mg/dL  --  0.64   ALK PHOS U/L  --  53   ALT U/L  --  15   AST U/L  --  17   GLUCOSE RANDOM mg/dL 103 109         Results from last 7 days   Lab Units 08/27/24  1150 08/27/24  0618 08/27/24  0108 08/26/24  1539 08/26/24  1223   POC GLUCOSE mg/dl 94 112 114 93 98     Results from last 7 days   Lab Units 08/26/24  0531   HEMOGLOBIN A1C % 5.9*     Results from last 7 days   Lab Units 08/25/24  0658   LACTIC ACID mmol/L 1.9           * I Have Reviewed All Lab Data Listed Above.  * Additional Pertinent Lab Tests Reviewed: All Labs For Current Hospital Admission Reviewed    Mobility:  Basic Mobility Inpatient Raw Score: 22  -[facility] Goal: 7: Walk 25 feet or more  -[facility] Achieved: 6: Walk 10 steps or more    Lines:     Invasive Devices       Peripheral Intravenous Line  Duration             Peripheral IV 08/25/24 Right;Ventral (anterior) Forearm 1 day                       Imaging:    Imaging Reports Reviewed Today Include:     CT abdomen pelvis with contrast    Result Date: 8/25/2024  Impression: Mid small bowel obstruction with transition in the right side of a moderate umbilical hernia containing incarcerated bowel and fat. Dilated small bowel distended up to 3.7 cm. No pneumatosis intestinalis. Additional chronic findings and negatives as above. I personally discussed this study with NAHOMY CORTES on 8/25/2024 at 11:28 Workstation performed: [id number]        Recent Cultures (last 7 days):           Last 24 Hours Medication List:   Current Facility-Administered Medications   Medication Dose Route Frequency Provider Last Rate    acetaminophen  650 mg Oral Q6H PRN López Roberson PA-C      heparin (porcine)  7,500 Units Subcutaneous Q8H [facility] López Roberson PA-C      ketorolac  15 mg Intravenous Q6H PRN López Roberson PA-C      lactated ringers  125 mL/hr Intravenous Continuous Shay Elise  mL/hr (08/27/24 0537)    ondansetron  4 mg  Intravenous Q6H PRN Shay Elise MD      oxyCODONE  10 mg Oral Q4H PRN López Roberson PA-C      oxyCODONE  5 mg Oral Q4H PRN López Roberson PA-C      oxymetazoline  2 spray Each Nare HS Shay Elise MD      pantoprazole  40 mg Intravenous Q24H [facility] Shay Elise MD          Today, Patient Was Seen By: Tato Saenz MD    ** Please Note: Dictation voice to text software may have been used in the creation of this document. **

## 2024-08-27 NOTE — CASE MANAGEMENT
Case Management Progress Note    Patient name Mariano Lubin  Location /-01 MRN [id number]  : 1975 Date 2024       LOS (days): 2  Geometric Mean LOS (GMLOS) (days):   Days to GMLOS:        OBJECTIVE:        Current admission status: Inpatient  Preferred Pharmacy:   [facility] #[id number] - [place] - [address]  [address]  [place] [id number]  Phone: [phone] Fax: [phone]    Primary Care Provider: Loren Valentin    Primary Insurance: [employer]  Secondary Insurance:     PROGRESS NOTE:  As per SLIM rounds, pt is pending medical clearance from [facility]. Pt is anticipated for dc within 24hrs.  CM continue to follow and assist with pt dc plans.

## 2024-08-27 NOTE — ASSESSMENT & PLAN NOTE
Admitted under general surgery service  CT POA shows Mid small bowel obstruction with transition in the right side of a moderate umbilical hernia containing incarcerated bowel and fat   Further management per surgery service  Cont IV fluids, DVT prophylaxis with heparin  Plan to advance diet to clear liquids, potentially further later this afternoon  If tolerating can potentially discharge in the next 24 hours

## 2024-08-27 NOTE — PROGRESS NOTES
"Progress Note - General Surgery   Mariano Lubin 49 y.o. male MRN: [id number]  Unit/Bed#: -01 Encounter: [id number]    Assessment:  Mariano Lubin is a 49 y.o. male with recurrent incarcerated ventral hernia.  Reduced bedside  Patient refusing NGT placement  Started passing flatus 8/27    AVSS, no leukocytosis, remainder of labs are WNL  Passing flatus, abd pain improving    Plan:  Continue CLD. Advance this afternoon if tolerated.   Continue IVF until tolerating a diet  OOB and ambulating halls today  Monitor abd exam, labs and vitals  PRN pain medication and anti-emetics  Encourage ambulation  DVT ppx: heparin  Incentive spirometry 10 times/hour while awake  Continue home medications as prescribed   General surgery is primary. Plan for dc within 24 hours.    Subjective/Objective    Subjective: No acute events overnight.     Objective:     Blood pressure 117/62, pulse 77, temperature 98.6 °F (37 °C), resp. rate 18, height 5' 8\" (1.727 m), SpO2 93%.,Body mass index is 79.31 kg/m².    No intake or output data in the 24 hours ending 08/27/24 0846    Invasive Devices       Peripheral Intravenous Line  Duration             Peripheral IV 08/25/24 Right;Ventral (anterior) Forearm 1 day                    Physical Exam:   GEN: NAD  HEENT: NCAT, MMM  CV: RRR, no m/r/g  Lung: Normal effort, CTA B/L, no w/r/r  Ab: Soft, ND, NT  Extrem: No CCE   Neuro: A+Ox3     Lab, Imaging and other studies:I have personally reviewed pertinent lab results.     VTE Pharmacologic Prophylaxis: Heparin  VTE Mechanical Prophylaxis: sequential compression device    Recent Results (from the past 36 hour(s))   Comprehensive metabolic panel    Collection Time: 08/26/24  5:31 AM   Result Value Ref Range    Sodium 139 135 - 147 mmol/L    Potassium 4.1 3.5 - 5.3 mmol/L    Chloride 103 96 - 108 mmol/L    CO2 31 21 - 32 mmol/L    ANION GAP 5 4 - 13 mmol/L    BUN 17 5 - 25 mg/dL    Creatinine 0.92 0.60 - 1.30 mg/dL    Glucose 109 65 - 140 mg/dL    " Calcium 8.9 8.4 - 10.2 mg/dL    AST 17 13 - 39 U/L    ALT 15 7 - 52 U/L    Alkaline Phosphatase 53 34 - 104 U/L    Total Protein 7.1 6.4 - 8.4 g/dL    Albumin 3.7 3.5 - 5.0 g/dL    Total Bilirubin 0.64 0.20 - 1.00 mg/dL    eGFR 95 ml/min/1.73sq m   Magnesium    Collection Time: 08/26/24  5:31 AM   Result Value Ref Range    Magnesium 2.3 1.9 - 2.7 mg/dL   Phosphorus    Collection Time: 08/26/24  5:31 AM   Result Value Ref Range    Phosphorus 2.9 2.7 - 4.5 mg/dL   CBC and differential    Collection Time: 08/26/24  5:31 AM   Result Value Ref Range    WBC 7.80 4.31 - 10.16 Thousand/uL    RBC 4.44 3.88 - 5.62 Million/uL    Hemoglobin 13.1 12.0 - 17.0 g/dL    Hematocrit 40.4 36.5 - 49.3 %    MCV 91 82 - 98 fL    MCH 29.5 26.8 - 34.3 pg    MCHC 32.4 31.4 - 37.4 g/dL    RDW 15.6 (H) 11.6 - 15.1 %    MPV 8.9 8.9 - 12.7 fL    Platelets 316 149 - 390 Thousands/uL    nRBC 0 /100 WBCs    Segmented % 68 43 - 75 %    Immature Grans % 0 0 - 2 %    Lymphocytes % 15 14 - 44 %    Monocytes % 15 (H) 4 - 12 %    Eosinophils Relative 2 0 - 6 %    Basophils Relative 0 0 - 1 %    Absolute Neutrophils 5.27 1.85 - 7.62 Thousands/µL    Absolute Immature Grans 0.01 0.00 - 0.20 Thousand/uL    Absolute Lymphocytes 1.15 0.60 - 4.47 Thousands/µL    Absolute Monocytes 1.20 0.17 - 1.22 Thousand/µL    Eosinophils Absolute 0.15 0.00 - 0.61 Thousand/µL    Basophils Absolute 0.02 0.00 - 0.10 Thousands/µL   Hemoglobin A1C    Collection Time: 08/26/24  5:31 AM   Result Value Ref Range    Hemoglobin A1C 5.9 (H) Normal 4.0-5.6%; PreDiabetic 5.7-6.4%; Diabetic >=6.5%; Glycemic control for adults with diabetes <7.0% %     mg/dl   Fingerstick Glucose (POCT)    Collection Time: 08/26/24 12:23 PM   Result Value Ref Range    POC Glucose 98 65 - 140 mg/dl   Fingerstick Glucose (POCT)    Collection Time: 08/26/24  3:39 PM   Result Value Ref Range    POC Glucose 93 65 - 140 mg/dl   Fingerstick Glucose (POCT)    Collection Time: 08/27/24  1:08 AM   Result  Value Ref Range    POC Glucose 114 65 - 140 mg/dl   Fingerstick Glucose (POCT)    Collection Time: 08/27/24  6:18 AM   Result Value Ref Range    POC Glucose 112 65 - 140 mg/dl   CBC and differential    Collection Time: 08/27/24  6:22 AM   Result Value Ref Range    WBC 8.16 4.31 - 10.16 Thousand/uL    RBC 4.02 3.88 - 5.62 Million/uL    Hemoglobin 11.9 (L) 12.0 - 17.0 g/dL    Hematocrit 36.5 36.5 - 49.3 %    MCV 91 82 - 98 fL    MCH 29.6 26.8 - 34.3 pg    MCHC 32.6 31.4 - 37.4 g/dL    RDW 15.3 (H) 11.6 - 15.1 %    MPV 9.0 8.9 - 12.7 fL    Platelets 264 149 - 390 Thousands/uL    nRBC 0 /100 WBCs    Segmented % 62 43 - 75 %    Immature Grans % 0 0 - 2 %    Lymphocytes % 19 14 - 44 %    Monocytes % 17 (H) 4 - 12 %    Eosinophils Relative 2 0 - 6 %    Basophils Relative 0 0 - 1 %    Absolute Neutrophils 5.11 1.85 - 7.62 Thousands/µL    Absolute Immature Grans 0.01 0.00 - 0.20 Thousand/uL    Absolute Lymphocytes 1.51 0.60 - 4.47 Thousands/µL    Absolute Monocytes 1.35 (H) 0.17 - 1.22 Thousand/µL    Eosinophils Absolute 0.16 0.00 - 0.61 Thousand/µL    Basophils Absolute 0.02 0.00 - 0.10 Thousands/µL   Basic metabolic panel    Collection Time: 08/27/24  6:22 AM   Result Value Ref Range    Sodium 138 135 - 147 mmol/L    Potassium 4.1 3.5 - 5.3 mmol/L    Chloride 105 96 - 108 mmol/L    CO2 29 21 - 32 mmol/L    ANION GAP 4 4 - 13 mmol/L    BUN 17 5 - 25 mg/dL    Creatinine 0.92 0.60 - 1.30 mg/dL    Glucose 103 65 - 140 mg/dL    Calcium 8.4 8.4 - 10.2 mg/dL    eGFR 95 ml/min/1.73sq m

## 2024-08-28 VITALS
WEIGHT: 315 LBS | HEART RATE: 83 BPM | HEIGHT: 68 IN | BODY MASS INDEX: 47.74 KG/M2 | OXYGEN SATURATION: 96 % | RESPIRATION RATE: 18 BRPM | TEMPERATURE: 98.4 F | DIASTOLIC BLOOD PRESSURE: 88 MMHG | SYSTOLIC BLOOD PRESSURE: 127 MMHG

## 2024-08-28 PROBLEM — R65.10 SIRS (SYSTEMIC INFLAMMATORY RESPONSE SYNDROME) (HCC): Status: RESOLVED | Noted: 2024-08-26 | Resolved: 2024-08-28

## 2024-08-28 PROBLEM — K43.6 INCARCERATED VENTRAL HERNIA: Status: RESOLVED | Noted: 2023-06-18 | Resolved: 2024-08-28

## 2024-08-28 LAB
ANION GAP SERPL CALCULATED.3IONS-SCNC: 6 MMOL/L (ref 4–13)
BASOPHILS # BLD AUTO: 0.02 THOUSANDS/ÂΜL (ref 0–0.1)
BASOPHILS NFR BLD AUTO: 0 % (ref 0–1)
BUN SERPL-MCNC: 15 MG/DL (ref 5–25)
CALCIUM SERPL-MCNC: 8.5 MG/DL (ref 8.4–10.2)
CHLORIDE SERPL-SCNC: 103 MMOL/L (ref 96–108)
CO2 SERPL-SCNC: 27 MMOL/L (ref 21–32)
CREAT SERPL-MCNC: 0.92 MG/DL (ref 0.6–1.3)
EOSINOPHIL # BLD AUTO: 0.18 THOUSAND/ÂΜL (ref 0–0.61)
EOSINOPHIL NFR BLD AUTO: 2 % (ref 0–6)
ERYTHROCYTE [DISTWIDTH] IN BLOOD BY AUTOMATED COUNT: 14.9 % (ref 11.6–15.1)
GFR SERPL CREATININE-BSD FRML MDRD: 95 ML/MIN/1.73SQ M
GLUCOSE SERPL-MCNC: 110 MG/DL (ref 65–140)
GLUCOSE SERPL-MCNC: 98 MG/DL (ref 65–140)
HCT VFR BLD AUTO: 36.9 % (ref 36.5–49.3)
HGB BLD-MCNC: 12 G/DL (ref 12–17)
IMM GRANULOCYTES # BLD AUTO: 0.03 THOUSAND/UL (ref 0–0.2)
IMM GRANULOCYTES NFR BLD AUTO: 0 % (ref 0–2)
LYMPHOCYTES # BLD AUTO: 1.29 THOUSANDS/ÂΜL (ref 0.6–4.47)
LYMPHOCYTES NFR BLD AUTO: 13 % (ref 14–44)
MCH RBC QN AUTO: 29.6 PG (ref 26.8–34.3)
MCHC RBC AUTO-ENTMCNC: 32.5 G/DL (ref 31.4–37.4)
MCV RBC AUTO: 91 FL (ref 82–98)
MONOCYTES # BLD AUTO: 1.22 THOUSAND/ÂΜL (ref 0.17–1.22)
MONOCYTES NFR BLD AUTO: 12 % (ref 4–12)
NEUTROPHILS # BLD AUTO: 7.34 THOUSANDS/ÂΜL (ref 1.85–7.62)
NEUTS SEG NFR BLD AUTO: 73 % (ref 43–75)
NRBC BLD AUTO-RTO: 0 /100 WBCS
PLATELET # BLD AUTO: 296 THOUSANDS/UL (ref 149–390)
PMV BLD AUTO: 9.2 FL (ref 8.9–12.7)
POTASSIUM SERPL-SCNC: 3.9 MMOL/L (ref 3.5–5.3)
RBC # BLD AUTO: 4.05 MILLION/UL (ref 3.88–5.62)
SODIUM SERPL-SCNC: 136 MMOL/L (ref 135–147)
WBC # BLD AUTO: 10.08 THOUSAND/UL (ref 4.31–10.16)

## 2024-08-28 PROCEDURE — 85025 COMPLETE CBC W/AUTO DIFF WBC: CPT

## 2024-08-28 PROCEDURE — 82948 REAGENT STRIP/BLOOD GLUCOSE: CPT

## 2024-08-28 PROCEDURE — 99238 HOSP IP/OBS DSCHRG MGMT 30/<: CPT

## 2024-08-28 PROCEDURE — 80048 BASIC METABOLIC PNL TOTAL CA: CPT

## 2024-08-28 RX ORDER — AMOXICILLIN 250 MG
1 CAPSULE ORAL
Status: DISCONTINUED | OUTPATIENT
Start: 2024-08-28 | End: 2024-08-28

## 2024-08-28 RX ORDER — DOCUSATE SODIUM 100 MG/1
100 CAPSULE, LIQUID FILLED ORAL 2 TIMES DAILY
Qty: 60 CAPSULE | Refills: 0 | Status: SHIPPED | OUTPATIENT
Start: 2024-08-28 | End: 2024-09-27

## 2024-08-28 RX ORDER — AMOXICILLIN 250 MG
1 CAPSULE ORAL
Status: DISCONTINUED | OUTPATIENT
Start: 2024-08-28 | End: 2024-08-28 | Stop reason: HOSPADM

## 2024-08-28 RX ADMIN — DOCUSATE SODIUM 100 MG: 100 CAPSULE, LIQUID FILLED ORAL at 08:46

## 2024-08-28 RX ADMIN — SIMETHICONE 80 MG: 80 TABLET, CHEWABLE ORAL at 11:26

## 2024-08-28 RX ADMIN — SIMETHICONE 80 MG: 80 TABLET, CHEWABLE ORAL at 05:22

## 2024-08-28 RX ADMIN — ONDANSETRON 4 MG: 2 INJECTION INTRAMUSCULAR; INTRAVENOUS at 02:45

## 2024-08-28 RX ADMIN — OXYCODONE HYDROCHLORIDE 10 MG: 10 TABLET ORAL at 11:26

## 2024-08-28 RX ADMIN — HEPARIN SODIUM 7500 UNITS: 5000 INJECTION INTRAVENOUS; SUBCUTANEOUS at 05:23

## 2024-08-28 RX ADMIN — PANTOPRAZOLE SODIUM 40 MG: 40 INJECTION, POWDER, FOR SOLUTION INTRAVENOUS at 08:46

## 2024-08-28 RX ADMIN — ACETAMINOPHEN 650 MG: 325 TABLET, FILM COATED ORAL at 05:22

## 2024-08-28 RX ADMIN — CALCIUM CARBONATE (ANTACID) CHEW TAB 500 MG 500 MG: 500 CHEW TAB at 08:46

## 2024-08-28 RX ADMIN — OXYCODONE HYDROCHLORIDE 10 MG: 10 TABLET ORAL at 06:40

## 2024-08-28 RX ADMIN — SENNOSIDES AND DOCUSATE SODIUM 1 TABLET: 50; 8.6 TABLET ORAL at 09:37

## 2024-08-28 RX ADMIN — OXYCODONE HYDROCHLORIDE 10 MG: 10 TABLET ORAL at 02:45

## 2024-08-28 NOTE — PLAN OF CARE
Problem: PAIN - ADULT  Goal: Verbalizes/displays adequate comfort level or baseline comfort level  Description: Interventions:  - Encourage patient to monitor pain and request assistance  - Assess pain using appropriate pain scale  - Administer analgesics based on type and severity of pain and evaluate response  - Implement non-pharmacological measures as appropriate and evaluate response  - Consider cultural and social influences on pain and pain management  - Notify physician/advanced practitioner if interventions unsuccessful or patient reports new pain  Outcome: Progressing     Problem: INFECTION - ADULT  Goal: Absence or prevention of progression during hospitalization  Description: INTERVENTIONS:  - Assess and monitor for signs and symptoms of infection  - Monitor lab/diagnostic results  - Monitor all insertion sites, i.e. indwelling lines, tubes, and drains  - Monitor endotracheal if appropriate and nasal secretions for changes in amount and color  - Watson appropriate cooling/warming therapies per order  - Administer medications as ordered  - Instruct and encourage patient and family to use good hand hygiene technique  - Identify and instruct in appropriate isolation precautions for identified infection/condition  Outcome: Progressing  Goal: Absence of fever/infection during neutropenic period  Description: INTERVENTIONS:  - Monitor WBC    Outcome: Progressing     Problem: SAFETY ADULT  Goal: Patient will remain free of falls  Description: INTERVENTIONS:  - Educate patient/family on patient safety including physical limitations  - Instruct patient to call for assistance with activity   - Consult OT/PT to assist with strengthening/mobility   - Keep Call bell within reach  - Keep bed low and locked with side rails adjusted as appropriate  - Keep care items and personal belongings within reach  - Initiate and maintain comfort rounds  - Make Fall Risk Sign visible to staff  - Offer Toileting every 2 Hours,  in advance of need  - Initiate/Maintain bed alarm  - Obtain necessary fall risk management equipment  - Apply yellow socks and bracelet for high fall risk patients  - Consider moving patient to room near nurses station  Outcome: Progressing  Goal: Maintain or return to baseline ADL function  Description: INTERVENTIONS:  -  Assess patient's ability to carry out ADLs; assess patient's baseline for ADL function and identify physical deficits which impact ability to perform ADLs (bathing, care of mouth/teeth, toileting, grooming, dressing, etc.)  - Assess/evaluate cause of self-care deficits   - Assess range of motion  - Assess patient's mobility; develop plan if impaired  - Assess patient's need for assistive devices and provide as appropriate  - Encourage maximum independence but intervene and supervise when necessary  - Involve family in performance of ADLs  - Assess for home care needs following discharge   - Consider OT consult to assist with ADL evaluation and planning for discharge  - Provide patient education as appropriate  Outcome: Progressing  Goal: Maintains/Returns to pre admission functional level  Description: INTERVENTIONS:  - Perform AM-PAC 6 Click Basic Mobility/ Daily Activity assessment daily.  - Set and communicate daily mobility goal to care team and patient/family/caregiver.   - Collaborate with rehabilitation services on mobility goals if consulted  - Perform Range of Motion 4 times a day.  - Reposition patient every 2 hours.  - Dangle patient 4 times a day  - Stand patient 4 times a day  - Ambulate patient 4 times a day  - Out of bed to chair 4 times a day   - Out of bed for meals 4 times a day  - Out of bed for toileting  - Record patient progress and toleration of activity level   Outcome: Progressing     Problem: DISCHARGE PLANNING  Goal: Discharge to home or other facility with appropriate resources  Description: INTERVENTIONS:  - Identify barriers to discharge w/patient and caregiver  -  Arrange for needed discharge resources and transportation as appropriate  - Identify discharge learning needs (meds, wound care, etc.)  - Arrange for interpretive services to assist at discharge as needed  - Refer to Case Management Department for coordinating discharge planning if the patient needs post-hospital services based on physician/advanced practitioner order or complex needs related to functional status, cognitive ability, or social support system  Outcome: Progressing     Problem: Knowledge Deficit  Goal: Patient/family/caregiver demonstrates understanding of disease process, treatment plan, medications, and discharge instructions  Description: Complete learning assessment and assess knowledge base.  Interventions:  - Provide teaching at level of understanding  - Provide teaching via preferred learning methods  Outcome: Progressing     Problem: GASTROINTESTINAL - ADULT  Goal: Minimal or absence of nausea and/or vomiting  Description: INTERVENTIONS:  - Administer IV fluids if ordered to ensure adequate hydration  - Maintain NPO status until nausea and vomiting are resolved  - Nasogastric tube if ordered  - Administer ordered antiemetic medications as needed  - Provide nonpharmacologic comfort measures as appropriate  - Advance diet as tolerated, if ordered  - Consider nutrition services referral to assist patient with adequate nutrition and appropriate food choices  Outcome: Progressing  Goal: Maintains or returns to baseline bowel function  Description: INTERVENTIONS:  - Assess bowel function  - Encourage oral fluids to ensure adequate hydration  - Administer IV fluids if ordered to ensure adequate hydration  - Administer ordered medications as needed  - Encourage mobilization and activity  - Consider nutritional services referral to assist patient with adequate nutrition and appropriate food choices  Outcome: Progressing  Goal: Maintains adequate nutritional intake  Description: INTERVENTIONS:  - Monitor  percentage of each meal consumed  - Identify factors contributing to decreased intake, treat as appropriate  - Assist with meals as needed  - Monitor I&O, weight, and lab values if indicated  - Obtain nutrition services referral as needed  Outcome: Progressing     Problem: METABOLIC, FLUID AND ELECTROLYTES - ADULT  Goal: Electrolytes maintained within normal limits  Description: INTERVENTIONS:  - Monitor labs and assess patient for signs and symptoms of electrolyte imbalances  - Administer electrolyte replacement as ordered  - Monitor response to electrolyte replacements, including repeat lab results as appropriate  - Instruct patient on fluid and nutrition as appropriate  Outcome: Progressing  Goal: Fluid balance maintained  Description: INTERVENTIONS:  - Monitor labs   - Monitor I/O and WT  - Instruct patient on fluid and nutrition as appropriate  - Assess for signs & symptoms of volume excess or deficit  Outcome: Progressing     Problem: SKIN/TISSUE INTEGRITY - ADULT  Goal: Skin Integrity remains intact(Skin Breakdown Prevention)  Description: Assess:  -Perform Gilbert assessment every shift   -Clean and moisturize skin every shift  -Inspect skin when repositioning, toileting, and assisting with ADLS  -Assess under medical devices  -Assess extremities for adequate circulation and sensation     Bed Management:  -Have minimal linens on bed & keep smooth, unwrinkled  -Change linens as needed when moist or perspiring  -Avoid sitting or lying in one position for more than 2 hours while in bed    Toileting:  -Offer bedside commode    Activity:  -Mobilize patient 4 times a day  -Encourage activity and walks on unit  -Encourage or provide ROM exercises   -Turn and reposition patient every 2 Hours  -Use appropriate equipment to lift or move patient in bed  -Instruct/ Assist with weight shifting every 30 minutes when out of bed in chair  -Consider limitation of chair time 2 hour intervals    Skin Care:  -Avoid use of baby  powder, tape, friction and shearing, hot water or constrictive clothing  -Relieve pressure over bony prominences  -Do not massage red bony areas    Next Steps:  -Teach patient strategies to minimize risks   -Consider consults to  interdisciplinary teams  Outcome: Progressing     Problem: HEMATOLOGIC - ADULT  Goal: Maintains hematologic stability  Description: INTERVENTIONS  - Assess for signs and symptoms of bleeding or hemorrhage  - Monitor labs  - Administer supportive blood products/factors as ordered and appropriate  Outcome: Progressing

## 2024-08-28 NOTE — PLAN OF CARE
Problem: PAIN - ADULT  Goal: Verbalizes/displays adequate comfort level or baseline comfort level  Description: Interventions:  - Encourage patient to monitor pain and request assistance  - Assess pain using appropriate pain scale  - Administer analgesics based on type and severity of pain and evaluate response  - Implement non-pharmacological measures as appropriate and evaluate response  - Consider cultural and social influences on pain and pain management  - Notify physician/advanced practitioner if interventions unsuccessful or patient reports new pain  Outcome: Progressing     Problem: INFECTION - ADULT  Goal: Absence or prevention of progression during hospitalization  Description: INTERVENTIONS:  - Assess and monitor for signs and symptoms of infection  - Monitor lab/diagnostic results  - Monitor all insertion sites, i.e. indwelling lines, tubes, and drains  - Monitor endotracheal if appropriate and nasal secretions for changes in amount and color  - Butler appropriate cooling/warming therapies per order  - Administer medications as ordered  - Instruct and encourage patient and family to use good hand hygiene technique  - Identify and instruct in appropriate isolation precautions for identified infection/condition  Outcome: Progressing     Problem: GASTROINTESTINAL - ADULT  Goal: Minimal or absence of nausea and/or vomiting  Description: INTERVENTIONS:  - Administer IV fluids if ordered to ensure adequate hydration  - Maintain NPO status until nausea and vomiting are resolved  - Nasogastric tube if ordered  - Administer ordered antiemetic medications as needed  - Provide nonpharmacologic comfort measures as appropriate  - Advance diet as tolerated, if ordered  - Consider nutrition services referral to assist patient with adequate nutrition and appropriate food choices  Outcome: Progressing     Problem: GASTROINTESTINAL - ADULT  Goal: Maintains or returns to baseline bowel function  Description:  INTERVENTIONS:  - Assess bowel function  - Encourage oral fluids to ensure adequate hydration  - Administer IV fluids if ordered to ensure adequate hydration  - Administer ordered medications as needed  - Encourage mobilization and activity  - Consider nutritional services referral to assist patient with adequate nutrition and appropriate food choices  Outcome: Progressing     Problem: GASTROINTESTINAL - ADULT  Goal: Maintains adequate nutritional intake  Description: INTERVENTIONS:  - Monitor percentage of each meal consumed  - Identify factors contributing to decreased intake, treat as appropriate  - Assist with meals as needed  - Monitor I&O, weight, and lab values if indicated  - Obtain nutrition services referral as needed  Outcome: Progressing

## 2024-08-28 NOTE — DISCHARGE SUMMARY
"  Discharge Summary - Mariano Lubin 49 y.o. male MRN: [id number]    Unit/Bed#: -01 Encounter: [id number]    Admission Date:   Admission Orders (From admission, onward)       Ordered        08/25/24 1150  Inpatient Admission  Once                            Admitting Diagnosis: Primary hypertension [I10]  SBO (small bowel obstruction) (HCC) [K56.609]  Abdominal pain [R10.9]  Incarcerated hernia [K46.0]  Type 2 diabetes mellitus with other specified complication, without long-term current use of insulin (HCC) [E11.69]    HPI: As per HPI written on admission by Dr. Elise \"Mariano Lubin is a 49 y.o. male who presents to the emergency room complaining of crampy abdominal pain and several episodes of nausea and vomiting since this morning.  Patient had a history of incarcerated umbilical hernia in 2023 for which he underwent laparoscopic repair with sutures due to the presence of colon within the hernia sac.  Patient noted a lump above the umbilicus a month later after surgery.  He was doing well until yesterday when he started complaining of pain in the abdomen associated with nausea and vomiting.  He denies having any chills, fever or any other constitutional symptoms.  He does not recall any single event that provoked the symptoms.  I review labs and CT scan report, films are not available in the computer.\"    Procedures Performed:   Orders Placed This Encounter   Procedures    ED ECG Documentation Only       Summary of Hospital Course:   Hospital Course:   Mariano Lubin is a 49 y.o. male who presented on 8/25/24 for abdominal pain. CTAP demonstrated \"Mid small bowel obstruction with transition in the right side of a moderate umbilical hernia containing incarcerated bowel and fat. Dilated small bowel distended up to 3.7 cm. No pneumatosis intestinalis.\"  Patient was treated conservatively with bowel rest, n.p.o., IV fluids.  Patient refused NG tube.  Once abdominal pain improved and bowel function returned his " diet was advanced as tolerated. Vital signs are stable and laboratory work is within normal limits. Tolerating a regular diet and voiding spontaneously. Bowel function is present. Ambulating without difficulty. Pain is well controlled on oral pain medication. We discussed outpatient follow-up with general surgery in 2 weeks. The patient was educated on the diagnosis and treatment plan and all questions were adequately answered. The patient was deemed appropriate for discharge in the care of family on 8/28/2024.    Significant Findings, Care, Treatment and Services Provided: Conservative management for SBO secondary to incarcerated ventral hernia    Complications: N/A    Discharge Diagnosis: SBO 2/2 Incarcerated ventral hernia, resolved    Medical Problems       Resolved Problems  Date Reviewed: 8/27/2024            Resolved    * (Principal) Incarcerated ventral hernia 8/28/2024     Resolved by  López Roberson PA-C    SIRS (systemic inflammatory response syndrome) (HCC) 8/28/2024     Resolved by  López Roberson PA-C          Condition at Discharge: stable         Discharge instructions/Information to patient and family:   See after visit summary for information provided to patient and family.      Provisions for Follow-Up Care:  See after visit summary for information related to follow-up care and any pertinent home health orders.      PCP: Loren Valentin    Disposition: Home    Planned Readmission: No      Discharge Statement   I spent 30 minutes discharging the patient. This time was spent on the day of discharge. I had direct contact with the patient on the day of discharge. Additional documentation is required if more than 30 minutes were spent on discharge.     Discharge Medications:  See after visit summary for reconciled discharge medications provided to patient and family.

## 2024-08-28 NOTE — DISCHARGE INSTR - AVS FIRST PAGE
Return to the ED if you experience severe abdominal pain that is persistent, nausea, vomiting.   Do NOT lift more than 25lbs until your hernia is repaired. Continue working on lifestyle changes and weight loss.   Manually reduce hernia as demonstrated   Ensure you have daily bowel movements to avoid constipation. Take stool softeners daily and laxatives as needed.   You do not need to see us in the office unless you have persistent symptoms.   Follow up with your weight loss doctor and bariatric surgeon.

## 2024-08-29 NOTE — UTILIZATION REVIEW
NOTIFICATION OF ADMISSION DISCHARGE   This is a Notification of Discharge from [facility]. Please be advised that this patient has been discharge from our facility. Below you will find the admission and discharge date and time including the patient’s disposition.   UTILIZATION REVIEW CONTACT:  Alem Westbrook  Utilization   Network Utilization Review Department  Phone: [phone] x carefully listen to the prompts. All voicemails are confidential.  Email: [email or web address]     ADMISSION INFORMATION  PRESENTATION DATE: 8/25/2024  6:28 AM  OBERVATION ADMISSION DATE: N/A  INPATIENT ADMISSION DATE: 8/25/24 11:50 AM   DISCHARGE DATE: 8/28/2024 11:49 AM   DISPOSITION:Home/Self Care    Network Utilization Review Department  ATTENTION: Please call with any questions or concerns to [phone] and carefully listen to the prompts so that you are directed to the right person. All voicemails are confidential.   For Discharge needs, contact Care Management DC Support Team at [phone] opt. 2  Send all requests for admission clinical reviews, approved or denied determinations and any other requests to dedicated fax number below belonging to the campus where the patient is receiving treatment. List of dedicated fax numbers for the Facilities:  FACILITY NAME UR FAX NUMBER   ADMISSION DENIALS (Administrative/Medical Necessity) [phone]   DISCHARGE SUPPORT TEAM ([facility]) [phone]   PARENT CHILD HEALTH (Maternity/NICU/Pediatrics) [phone]   [facility] [phone]   [facility] [phone]   [facility] [phone]   [facility] [phone]   [facility] [phone]   [facility] [phone]   [facility] [phone]   [facility]  [phone]   [facility] [phone]   [facility] [phone]   General acute hospital [phone]   [facility] [phone]

## 2025-07-25 ENCOUNTER — HOSPITAL ENCOUNTER (EMERGENCY)
Facility: HOSPITAL | Age: 50
Discharge: HOME | End: 2025-07-25
Attending: EMERGENCY MEDICINE
Payer: COMMERCIAL

## 2025-07-25 ENCOUNTER — APPOINTMENT (EMERGENCY)
Dept: RADIOLOGY | Facility: HOSPITAL | Age: 50
End: 2025-07-25
Payer: COMMERCIAL

## 2025-07-25 VITALS
TEMPERATURE: 98 F | DIASTOLIC BLOOD PRESSURE: 53 MMHG | HEART RATE: 71 BPM | OXYGEN SATURATION: 99 % | RESPIRATION RATE: 14 BRPM | SYSTOLIC BLOOD PRESSURE: 104 MMHG

## 2025-07-25 DIAGNOSIS — R07.9 CHEST PAIN: Primary | ICD-10-CM

## 2025-07-25 LAB
2HR DELTA HS TROPONIN: 0 NG/L
ALBUMIN SERPL BCG-MCNC: 4.4 G/DL (ref 3.5–5)
ALP SERPL-CCNC: 77 U/L (ref 34–104)
ALT SERPL W P-5'-P-CCNC: 18 U/L (ref 7–52)
ANION GAP SERPL CALCULATED.3IONS-SCNC: 7 MMOL/L (ref 4–13)
AST SERPL W P-5'-P-CCNC: 18 U/L (ref 13–39)
ATRIAL RATE: 77 BPM
ATRIAL RATE: 89 BPM
BASOPHILS # BLD AUTO: 0.04 THOUSANDS/ÂΜL (ref 0–0.1)
BASOPHILS NFR BLD AUTO: 0 % (ref 0–1)
BILIRUB SERPL-MCNC: 0.55 MG/DL (ref 0.2–1)
BUN SERPL-MCNC: 18 MG/DL (ref 5–25)
CALCIUM SERPL-MCNC: 9.9 MG/DL (ref 8.4–10.2)
CARDIAC TROPONIN I PNL SERPL HS: 3 NG/L (ref ?–50)
CARDIAC TROPONIN I PNL SERPL HS: 3 NG/L (ref ?–50)
CHLORIDE SERPL-SCNC: 100 MMOL/L (ref 96–108)
CO2 SERPL-SCNC: 29 MMOL/L (ref 21–32)
CREAT SERPL-MCNC: 1.19 MG/DL (ref 0.6–1.3)
EOSINOPHIL # BLD AUTO: 0.3 THOUSAND/ÂΜL (ref 0–0.61)
EOSINOPHIL NFR BLD AUTO: 3 % (ref 0–6)
ERYTHROCYTE [DISTWIDTH] IN BLOOD BY AUTOMATED COUNT: 14.5 % (ref 11.6–15.1)
GFR SERPL CREATININE-BSD FRML MDRD: 69 ML/MIN/1.73SQ M
GLUCOSE SERPL-MCNC: 88 MG/DL (ref 65–140)
HCT VFR BLD AUTO: 43.5 % (ref 36.5–49.3)
HGB BLD-MCNC: 14.4 G/DL (ref 12–17)
IMM GRANULOCYTES # BLD AUTO: 0.01 THOUSAND/UL (ref 0–0.2)
IMM GRANULOCYTES NFR BLD AUTO: 0 % (ref 0–2)
LYMPHOCYTES # BLD AUTO: 2 THOUSANDS/ÂΜL (ref 0.6–4.47)
LYMPHOCYTES NFR BLD AUTO: 21 % (ref 14–44)
MCH RBC QN AUTO: 29.6 PG (ref 26.8–34.3)
MCHC RBC AUTO-ENTMCNC: 33.1 G/DL (ref 31.4–37.4)
MCV RBC AUTO: 89 FL (ref 82–98)
MONOCYTES # BLD AUTO: 0.84 THOUSAND/ÂΜL (ref 0.17–1.22)
MONOCYTES NFR BLD AUTO: 9 % (ref 4–12)
NEUTROPHILS # BLD AUTO: 6.51 THOUSANDS/ÂΜL (ref 1.85–7.62)
NEUTS SEG NFR BLD AUTO: 67 % (ref 43–75)
NRBC BLD AUTO-RTO: 0 /100 WBCS
P AXIS: 58 DEGREES
P AXIS: 59 DEGREES
PLATELET # BLD AUTO: 354 THOUSANDS/UL (ref 149–390)
PMV BLD AUTO: 9.2 FL (ref 8.9–12.7)
POTASSIUM SERPL-SCNC: 4 MMOL/L (ref 3.5–5.3)
PR INTERVAL: 186 MS
PR INTERVAL: 204 MS
PROT SERPL-MCNC: 7.9 G/DL (ref 6.4–8.4)
QRS AXIS: -15 DEGREES
QRS AXIS: -23 DEGREES
QRSD INTERVAL: 68 MS
QRSD INTERVAL: 84 MS
QT INTERVAL: 338 MS
QT INTERVAL: 346 MS
QTC INTERVAL: 382 MS
QTC INTERVAL: 420 MS
RBC # BLD AUTO: 4.87 MILLION/UL (ref 3.88–5.62)
SODIUM SERPL-SCNC: 136 MMOL/L (ref 135–147)
T WAVE AXIS: 53 DEGREES
T WAVE AXIS: 54 DEGREES
VENTRICULAR RATE: 77 BPM
VENTRICULAR RATE: 89 BPM
WBC # BLD AUTO: 9.7 THOUSAND/UL (ref 4.31–10.16)

## 2025-07-25 PROCEDURE — 80053 COMPREHEN METABOLIC PANEL: CPT | Performed by: EMERGENCY MEDICINE

## 2025-07-25 PROCEDURE — 71046 X-RAY EXAM CHEST 2 VIEWS: CPT

## 2025-07-25 PROCEDURE — 99284 EMERGENCY DEPT VISIT MOD MDM: CPT | Performed by: EMERGENCY MEDICINE

## 2025-07-25 PROCEDURE — 99285 EMERGENCY DEPT VISIT HI MDM: CPT

## 2025-07-25 PROCEDURE — 36415 COLL VENOUS BLD VENIPUNCTURE: CPT | Performed by: EMERGENCY MEDICINE

## 2025-07-25 PROCEDURE — 84484 ASSAY OF TROPONIN QUANT: CPT | Performed by: EMERGENCY MEDICINE

## 2025-07-25 PROCEDURE — 85025 COMPLETE CBC W/AUTO DIFF WBC: CPT | Performed by: EMERGENCY MEDICINE

## 2025-07-25 PROCEDURE — 93005 ELECTROCARDIOGRAM TRACING: CPT

## 2025-07-25 PROCEDURE — 93010 ELECTROCARDIOGRAM REPORT: CPT | Performed by: INTERNAL MEDICINE

## 2025-07-25 RX ORDER — SODIUM CHLORIDE 9 MG/ML
3 INJECTION INTRAVENOUS
Status: DISCONTINUED | OUTPATIENT
Start: 2025-07-25 | End: 2025-07-25 | Stop reason: HOSPADM

## 2025-07-25 NOTE — ED PROVIDER NOTES
Time reflects when diagnosis was documented in both MDM as applicable and the Disposition within this note       Time User Action Codes Description Comment    7/25/2025 10:29 AM Seema Huber [R07.9] Chest pain           ED Disposition       ED Disposition   Discharge    Condition   Stable    Date/Time   Fri Jul 25, 2025  9:45 AM    Comment   Mariano Lubin discharge to home/self care.                   Assessment & Plan       Medical Decision Making  49 y/o male with chest pain- will get labs, trop/ EKG, and cxr. Will reassess for dispo     Amount and/or Complexity of Data Reviewed  Labs: ordered.  Radiology: ordered.    Risk  Prescription drug management.        ED Course as of 07/25/25 1221   Fri Jul 25, 2025   0742 Intermittent chest pain x 2 days. Nothing worsens. No back pain. Pressure stabbing at times. Comes on random. Bilateral arms tingling.        Medications   sodium chloride (PF) 0.9 % injection 3 mL (has no administration in time range)       ED Risk Strat Scores   HEART Risk Score      Flowsheet Row Most Recent Value   Heart Score Risk Calculator    History 0 Filed at: 07/25/2025 1029   ECG 0 Filed at: 07/25/2025 1029   Age 1 Filed at: 07/25/2025 1029   Risk Factors 1 Filed at: 07/25/2025 1029   Troponin 0 Filed at: 07/25/2025 1029   HEART Score 2 Filed at: 07/25/2025 1029          HEART Risk Score      Flowsheet Row Most Recent Value   Heart Score Risk Calculator    History 0 Filed at: 07/25/2025 1029   ECG 0 Filed at: 07/25/2025 1029   Age 1 Filed at: 07/25/2025 1029   Risk Factors 1 Filed at: 07/25/2025 1029   Troponin 0 Filed at: 07/25/2025 1029   HEART Score 2 Filed at: 07/25/2025 1029                      No data recorded        SBIRT 20yo+      Flowsheet Row Most Recent Value   Initial Alcohol Screen: US AUDIT-C     1. How often do you have a drink containing alcohol? 0 Filed at: 07/25/2025 0719   2. How many drinks containing alcohol do you have on a typical day you are drinking?  0  Filed at: 07/25/2025 0719   3a. Male UNDER 65: How often do you have five or more drinks on one occasion? 0 Filed at: 07/25/2025 0719   3b. FEMALE Any Age, or MALE 65+: How often do you have 4 or more drinks on one occassion? 0 Filed at: 07/25/2025 0719   Audit-C Score 0 Filed at: 07/25/2025 0719   BROWN: How many times in the past year have you...    Used an illegal drug or used a prescription medication for non-medical reasons? Never Filed at: 07/25/2025 0719                            History of Present Illness       Chief Complaint   Patient presents with    Chest Pain     Pt reports on going chest pain for 2 days, describes as chest tightness, bilateral arm tingling starting this morning. + nausea, sweating, denies vomiting, denies cardiac hx.        Past Medical History[1]   Past Surgical History[2]   Family History[3]   Social History[4]   E-Cigarette/Vaping    E-Cigarette Use Never User       E-Cigarette/Vaping Substances    Nicotine No     THC No     CBD No     Flavoring No     Other No     Unknown No       I have reviewed and agree with the history as documented.     50-year-old male presents the emergency room for chest pain x 2 days.  Patient states that he has had intemrittent chest pressure x 2 days. States that he also has intermittent sharp pain. Nothing worsens or improves the pain. States that he also has tingling to his bilateral arms. No back pain. Has nausea but denies vomiting. No hx of similar. No sob, f/c, abd pain, or d/c. No other complaints. Has not tried anything for the symptoms       History provided by:  Patient  Chest Pain  Pain location:  Substernal area  Pain quality: pressure and sharp    Pain radiates to:  Does not radiate  Pain radiates to the back: no    Onset quality:  Sudden  Timing:  Intermittent  Chronicity:  New  Relieved by:  None tried  Worsened by:  Nothing tried  Ineffective treatments:  None tried  Associated symptoms: nausea    Associated symptoms: no abdominal pain,  no back pain, no cough, no fever, no headache, no numbness, no shortness of breath, not vomiting and no weakness        Review of Systems   Constitutional:  Negative for chills and fever.   HENT:  Negative for congestion, ear pain and sore throat.    Eyes:  Negative for pain and visual disturbance.   Respiratory:  Negative for cough, shortness of breath and wheezing.    Cardiovascular:  Positive for chest pain. Negative for leg swelling.   Gastrointestinal:  Positive for nausea. Negative for abdominal pain, diarrhea and vomiting.   Genitourinary:  Negative for dysuria, frequency, hematuria and urgency.   Musculoskeletal:  Negative for back pain, neck pain and neck stiffness.   Skin:  Negative for rash and wound.   Neurological:  Negative for weakness, numbness and headaches.   Psychiatric/Behavioral:  Negative for agitation and confusion.    All other systems reviewed and are negative.          Objective       ED Triage Vitals   Temperature Pulse Blood Pressure Respirations SpO2 Patient Position - Orthostatic VS   07/25/25 0722 07/25/25 0719 07/25/25 0719 07/25/25 0719 07/25/25 0719 --   98 °F (36.7 °C) 83 130/63 22 98 %       Temp Source Heart Rate Source BP Location FiO2 (%) Pain Score    07/25/25 0722 07/25/25 0719 07/25/25 0719 -- 07/25/25 0719    Oral Monitor Right arm  4      Vitals      Date and Time Temp Pulse SpO2 Resp BP Pain Score FACES Pain Rating User   07/25/25 0830 -- 71 99 % 14 104/53 -- -- KM   07/25/25 0722 98 °F (36.7 °C) -- -- -- -- -- -- FS   07/25/25 0719 -- 83 98 % 22 130/63 4 -- CB            Physical Exam  Vitals and nursing note reviewed.   Constitutional:       Appearance: He is well-developed.   HENT:      Head: Normocephalic and atraumatic.     Eyes:      Pupils: Pupils are equal, round, and reactive to light.       Cardiovascular:      Rate and Rhythm: Normal rate and regular rhythm.   Pulmonary:      Effort: Pulmonary effort is normal.      Breath sounds: Normal breath sounds.    Abdominal:      General: Bowel sounds are normal.      Palpations: Abdomen is soft.     Musculoskeletal:         General: Normal range of motion.      Cervical back: Normal range of motion and neck supple.     Skin:     General: Skin is warm and dry.     Neurological:      General: No focal deficit present.      Mental Status: He is alert and oriented to person, place, and time.      Comments: No focal deficits       Results Reviewed       Procedure Component Value Units Date/Time    HS Troponin I 2hr [[id number]]  (Normal) Collected: 07/25/25 0945    Lab Status: Final result Specimen: Blood from Arm, Right Updated: 07/25/25 1015     hs TnI 2hr 3 ng/L      Delta 2hr hsTnI 0 ng/L     HS Troponin I 4hr [[id number]]     Lab Status: No result Specimen: Blood     HS Troponin 0hr (reflex protocol) [[id number]]  (Normal) Collected: 07/25/25 0747    Lab Status: Final result Specimen: Blood from Arm, Right Updated: 07/25/25 0817     hs TnI 0hr 3 ng/L     Comprehensive metabolic panel [[id number]] Collected: 07/25/25 0747    Lab Status: Final result Specimen: Blood from Arm, Right Updated: 07/25/25 0812     Sodium 136 mmol/L      Potassium 4.0 mmol/L      Chloride 100 mmol/L      CO2 29 mmol/L      ANION GAP 7 mmol/L      BUN 18 mg/dL      Creatinine 1.19 mg/dL      Glucose 88 mg/dL      Calcium 9.9 mg/dL      AST 18 U/L      ALT 18 U/L      Alkaline Phosphatase 77 U/L      Total Protein 7.9 g/dL      Albumin 4.4 g/dL      Total Bilirubin 0.55 mg/dL      eGFR 69 ml/min/1.73sq m     Narrative:      [employer] guidelines for Chronic Kidney Disease (CKD):     Stage 1 with normal or high GFR (GFR > 90 mL/min/1.73 square meters)    Stage 2 Mild CKD (GFR = 60-89 mL/min/1.73 square meters)    Stage 3A Moderate CKD (GFR = 45-59 mL/min/1.73 square meters)    Stage 3B Moderate CKD (GFR = 30-44 mL/min/1.73 square meters)    Stage 4 Severe CKD (GFR = 15-29 mL/min/1.73 square meters)    Stage 5 End Stage CKD (GFR <15  mL/min/1.73 square meters)  Note: GFR calculation is accurate only with a steady state creatinine    CBC and differential [[id number]] Collected: 07/25/25 0717    Lab Status: Final result Specimen: Blood from Arm, Right Updated: 07/25/25 0750     WBC 9.70 Thousand/uL      RBC 4.87 Million/uL      Hemoglobin 14.4 g/dL      Hematocrit 43.5 %      MCV 89 fL      MCH 29.6 pg      MCHC 33.1 g/dL      RDW 14.5 %      MPV 9.2 fL      Platelets 354 Thousands/uL      nRBC 0 /100 WBCs      Segmented % 67 %      Immature Grans % 0 %      Lymphocytes % 21 %      Monocytes % 9 %      Eosinophils Relative 3 %      Basophils Relative 0 %      Absolute Neutrophils 6.51 Thousands/µL      Absolute Immature Grans 0.01 Thousand/uL      Absolute Lymphocytes 2.00 Thousands/µL      Absolute Monocytes 0.84 Thousand/µL      Eosinophils Absolute 0.30 Thousand/µL      Basophils Absolute 0.04 Thousands/µL             X-ray chest 2 views   Final Interpretation by Deepa Moreno MD (07/25 0821)   No acute consolidation or congestion            Workstation performed: [id number]             ECG 12 Lead Documentation Only    Date/Time: 7/25/2025 12:19 PM    Performed by: Seema Huber DO  Authorized by: Seema Huber DO    Indications / Diagnosis:  Chest pain  Patient location:  ED  Rate:     ECG rate:  89    ECG rate assessment: normal    Rhythm:     Rhythm: sinus rhythm    Ectopy:     Ectopy: none    QRS:     QRS axis:  Left    QRS intervals:  Normal  Conduction:     Conduction: normal    ST segments:     ST segments:  Normal  T waves:     T waves: normal        ED Medication and Procedure Management   Prior to Admission Medications   Prescriptions Last Dose Informant Patient Reported? Taking?   Ozempic, 0.25 or 0.5 MG/DOSE, 2 MG/3ML injection pen  Self Yes No   Sig: INJECT 0.5 MG UNDER THE SKIN EVERY WEEK FOR 28 DAYS   buPROPion (WELLBUTRIN) 100 mg tablet  Self Yes No   Sig: Take 100 mg by mouth 2 (two) times a day   docusate sodium  (COLACE) 100 mg capsule   No No   Sig: Take 1 capsule (100 mg total) by mouth 2 (two) times a day   metFORMIN (GLUCOPHAGE) 500 mg tablet  Self Yes No   Sig: Take 500 mg by mouth 2 (two) times a day   metFORMIN (GLUCOPHAGE-XR) 500 mg 24 hr tablet  Self Yes No   Sig: Take 500 mg by mouth daily with breakfast   telmisartan-hydrochlorothiazide (MICARDIS HCT) 40-12.5 MG per tablet  Self Yes No   Sig: Take 1 tablet by mouth daily   torsemide (DEMADEX) 100 mg tablet  Self Yes No   Sig: Take 20 mg by mouth daily      Facility-Administered Medications: None     Discharge Medication List as of 7/25/2025 10:31 AM        CONTINUE these medications which have NOT CHANGED    Details   buPROPion (WELLBUTRIN) 100 mg tablet Take 100 mg by mouth 2 (two) times a day, Historical Med      docusate sodium (COLACE) 100 mg capsule Take 1 capsule (100 mg total) by mouth 2 (two) times a day, Starting Wed 8/28/2024, Until Fri 9/27/2024, Normal      metFORMIN (GLUCOPHAGE) 500 mg tablet Take 500 mg by mouth 2 (two) times a day, Historical Med      metFORMIN (GLUCOPHAGE-XR) 500 mg 24 hr tablet Take 500 mg by mouth daily with breakfast, Historical Med      Ozempic, 0.25 or 0.5 MG/DOSE, 2 MG/3ML injection pen INJECT 0.5 MG UNDER THE SKIN EVERY WEEK FOR 28 DAYS, Historical Med      telmisartan-hydrochlorothiazide (MICARDIS HCT) 40-12.5 MG per tablet Take 1 tablet by mouth daily, Historical Med      torsemide (DEMADEX) 100 mg tablet Take 20 mg by mouth daily, Historical Med           No discharge procedures on file.  ED SEPSIS DOCUMENTATION   Time reflects when diagnosis was documented in both MDM as applicable and the Disposition within this note       Time User Action Codes Description Comment    7/25/2025 10:29 AM Seema Huber Add [R07.9] Chest pain                      [1]   Past Medical History:  Diagnosis Date    Cancer (HCC)     skin cancer on neck    Hypertension    [2]   Past Surgical History:  Procedure Laterality Date    VENTRAL  HERNIA REPAIR N/A 6/18/2023    Procedure: REPAIR HERNIA VENTRAL, laparoscopic;  Surgeon: Shay Elise MD;  Location: [facility];  Service: General   [3]   Family History  Problem Relation Name Age of Onset    COPD Father      Diabetes Father     [4]   Social History  Tobacco Use    Smoking status: Never    Smokeless tobacco: Never   Vaping Use    Vaping status: Never Used   Substance Use Topics    Alcohol use: Never    Drug use: Never        Seema Huber DO  07/25/25 1224

## (undated) DEVICE — ELECTRODE LAP L WIRE E-Z CLEAN 33CM -0100

## (undated) DEVICE — TIBURON LAPAROSCOPIC ABDOMINAL DRAPE: Brand: CONVERTORS

## (undated) DEVICE — 4-PORT MANIFOLD: Brand: NEPTUNE 2

## (undated) DEVICE — HARMONIC 1100 SHEARS, 36CM SHAFT LENGTH: Brand: HARMONIC

## (undated) DEVICE — ALLENTOWN LAP CHOLE APP PACK: Brand: CARDINAL HEALTH

## (undated) DEVICE — TISSUE RETRIEVAL SYSTEM: Brand: INZII RETRIEVAL SYSTEM

## (undated) DEVICE — GLOVE SRG BIOGEL ECLIPSE 7.5

## (undated) DEVICE — SUT STRATAFIX SPIRAL PDS PLUS 1 CTX 18 IN SXPP1A400

## (undated) DEVICE — Device

## (undated) DEVICE — GAUZE SPONGES,8 PLY: Brand: CURITY

## (undated) DEVICE — CHLORAPREP HI-LITE 26ML ORANGE

## (undated) DEVICE — GLOVE INDICATOR PI UNDERGLOVE SZ 7.5 BLUE

## (undated) DEVICE — NEEDLE SPINAL18G X 3.5 IN QUINCKE

## (undated) DEVICE — TROCAR: Brand: KII FIOS FIRST ENTRY

## (undated) DEVICE — 3M™ TEGADERM™ CHG DRESSING 25/CARTON 4 CARTONS/CASE 1659: Brand: TEGADERM™

## (undated) DEVICE — 3M™ STERI-STRIP™ REINFORCED ADHESIVE SKIN CLOSURES, R1542, 1/4 IN X 1-1/2 IN (6 MM X 38 MM), 6 STRIPS/ENVELOPE: Brand: 3M™ STERI-STRIP™

## (undated) DEVICE — [HIGH FLOW INSUFFLATOR,  DO NOT USE IF PACKAGE IS DAMAGED,  KEEP DRY,  KEEP AWAY FROM SUNLIGHT,  PROTECT FROM HEAT AND RADIOACTIVE SOURCES.]: Brand: PNEUMOSURE

## (undated) DEVICE — GAUZE SPONGES,USP TYPE VII GAUZE, 12 PLY: Brand: CURITY

## (undated) DEVICE — 3M™ STERI-STRIP™ REINFORCED ADHESIVE SKIN CLOSURES, R1546, 1/4 IN X 4 IN (6 MM X 100 MM), 10 STRIPS/ENVELOPE: Brand: 3M™ STERI-STRIP™

## (undated) DEVICE — METZENBAUM ADTEC SINGLE USE DISSECTING SCISSORS, SHAFT ONLY, MONOPOLAR, CURVED TO LEFT, WORKING LENGTH: 12 1/4", (310 MM), DIAM. 5 MM, INSULATED, DOUBLE ACTION, STERILE, DISPOSABLE, PACKAGE OF 10 PIECES: Brand: AESCULAP

## (undated) DEVICE — SCD SEQUENTIAL COMPRESSION COMFORT SLEEVE MEDIUM KNEE LENGTH: Brand: KENDALL SCD

## (undated) DEVICE — INTENDED FOR TISSUE SEPARATION, AND OTHER PROCEDURES THAT REQUIRE A SHARP SURGICAL BLADE TO PUNCTURE OR CUT.: Brand: BARD-PARKER SAFETY BLADES SIZE 15, STERILE

## (undated) DEVICE — NEPTUNE E-SEP SMOKE EVACUATION PENCIL, COATED, 70MM BLADE, PUSH BUTTON SWITCH: Brand: NEPTUNE E-SEP

## (undated) DEVICE — SUT NUROLON 0 CTX C551D

## (undated) DEVICE — SUT VICRYL 4-0 PS-2 27 IN J426H

## (undated) DEVICE — 3M™ TEGADERM™ TRANSPARENT FILM DRESSING FRAME STYLE, 1626W, 4 IN X 4-3/4 IN (10 CM X 12 CM), 50/CT 4CT/CASE: Brand: 3M™ TEGADERM™